# Patient Record
Sex: FEMALE | Race: OTHER | HISPANIC OR LATINO | URBAN - METROPOLITAN AREA
[De-identification: names, ages, dates, MRNs, and addresses within clinical notes are randomized per-mention and may not be internally consistent; named-entity substitution may affect disease eponyms.]

---

## 2018-01-24 ENCOUNTER — EMERGENCY (EMERGENCY)
Facility: HOSPITAL | Age: 38
LOS: 1 days | Discharge: HOME | End: 2018-01-24
Attending: EMERGENCY MEDICINE
Payer: COMMERCIAL

## 2018-01-24 VITALS
RESPIRATION RATE: 18 BRPM | DIASTOLIC BLOOD PRESSURE: 72 MMHG | SYSTOLIC BLOOD PRESSURE: 114 MMHG | HEART RATE: 84 BPM | OXYGEN SATURATION: 97 % | TEMPERATURE: 98 F

## 2018-01-24 VITALS
HEART RATE: 83 BPM | RESPIRATION RATE: 18 BRPM | DIASTOLIC BLOOD PRESSURE: 68 MMHG | SYSTOLIC BLOOD PRESSURE: 115 MMHG | OXYGEN SATURATION: 98 % | TEMPERATURE: 99 F

## 2018-01-24 LAB
ALBUMIN SERPL ELPH-MCNC: 3.7 G/DL — SIGNIFICANT CHANGE UP (ref 3.5–5)
ALP SERPL-CCNC: 51 U/L — SIGNIFICANT CHANGE UP (ref 40–120)
ALT FLD-CCNC: 19 U/L DA — SIGNIFICANT CHANGE UP (ref 10–60)
ANION GAP SERPL CALC-SCNC: 5 MMOL/L — SIGNIFICANT CHANGE UP (ref 5–17)
AST SERPL-CCNC: 19 U/L — SIGNIFICANT CHANGE UP (ref 10–40)
BASOPHILS # BLD AUTO: 0.1 K/UL — SIGNIFICANT CHANGE UP (ref 0–0.2)
BASOPHILS NFR BLD AUTO: 0.9 % — SIGNIFICANT CHANGE UP (ref 0–2)
BILIRUB SERPL-MCNC: 0.4 MG/DL — SIGNIFICANT CHANGE UP (ref 0.2–1.2)
BUN SERPL-MCNC: 10 MG/DL — SIGNIFICANT CHANGE UP (ref 7–18)
CALCIUM SERPL-MCNC: 8.6 MG/DL — SIGNIFICANT CHANGE UP (ref 8.4–10.5)
CHLORIDE SERPL-SCNC: 106 MMOL/L — SIGNIFICANT CHANGE UP (ref 96–108)
CO2 SERPL-SCNC: 29 MMOL/L — SIGNIFICANT CHANGE UP (ref 22–31)
CREAT SERPL-MCNC: 0.57 MG/DL — SIGNIFICANT CHANGE UP (ref 0.5–1.3)
EOSINOPHIL # BLD AUTO: 0.1 K/UL — SIGNIFICANT CHANGE UP (ref 0–0.5)
EOSINOPHIL NFR BLD AUTO: 0.9 % — SIGNIFICANT CHANGE UP (ref 0–6)
GLUCOSE SERPL-MCNC: 80 MG/DL — SIGNIFICANT CHANGE UP (ref 70–99)
HCG SERPL-ACNC: <1 MIU/ML — SIGNIFICANT CHANGE UP
HCG UR QL: NEGATIVE — SIGNIFICANT CHANGE UP
HCT VFR BLD CALC: 40.1 % — SIGNIFICANT CHANGE UP (ref 34.5–45)
HGB BLD-MCNC: 12.4 G/DL — SIGNIFICANT CHANGE UP (ref 11.5–15.5)
LYMPHOCYTES # BLD AUTO: 1.6 K/UL — SIGNIFICANT CHANGE UP (ref 1–3.3)
LYMPHOCYTES # BLD AUTO: 19.7 % — SIGNIFICANT CHANGE UP (ref 13–44)
MCHC RBC-ENTMCNC: 26.4 PG — LOW (ref 27–34)
MCHC RBC-ENTMCNC: 31 GM/DL — LOW (ref 32–36)
MCV RBC AUTO: 85.3 FL — SIGNIFICANT CHANGE UP (ref 80–100)
MONOCYTES # BLD AUTO: 0.6 K/UL — SIGNIFICANT CHANGE UP (ref 0–0.9)
MONOCYTES NFR BLD AUTO: 7.6 % — SIGNIFICANT CHANGE UP (ref 2–14)
NEUTROPHILS # BLD AUTO: 5.7 K/UL — SIGNIFICANT CHANGE UP (ref 1.8–7.4)
NEUTROPHILS NFR BLD AUTO: 70.9 % — SIGNIFICANT CHANGE UP (ref 43–77)
PLATELET # BLD AUTO: 293 K/UL — SIGNIFICANT CHANGE UP (ref 150–400)
POTASSIUM SERPL-MCNC: 4 MMOL/L — SIGNIFICANT CHANGE UP (ref 3.5–5.3)
POTASSIUM SERPL-SCNC: 4 MMOL/L — SIGNIFICANT CHANGE UP (ref 3.5–5.3)
PROT SERPL-MCNC: 7.5 G/DL — SIGNIFICANT CHANGE UP (ref 6–8.3)
RBC # BLD: 4.7 M/UL — SIGNIFICANT CHANGE UP (ref 3.8–5.2)
RBC # FLD: 14 % — SIGNIFICANT CHANGE UP (ref 10.3–14.5)
SODIUM SERPL-SCNC: 140 MMOL/L — SIGNIFICANT CHANGE UP (ref 135–145)
WBC # BLD: 8 K/UL — SIGNIFICANT CHANGE UP (ref 3.8–10.5)
WBC # FLD AUTO: 8 K/UL — SIGNIFICANT CHANGE UP (ref 3.8–10.5)

## 2018-01-24 PROCEDURE — 74176 CT ABD & PELVIS W/O CONTRAST: CPT | Mod: 26

## 2018-01-24 PROCEDURE — 74176 CT ABD & PELVIS W/O CONTRAST: CPT

## 2018-01-24 PROCEDURE — 96374 THER/PROPH/DIAG INJ IV PUSH: CPT

## 2018-01-24 PROCEDURE — 81025 URINE PREGNANCY TEST: CPT

## 2018-01-24 PROCEDURE — 80053 COMPREHEN METABOLIC PANEL: CPT

## 2018-01-24 PROCEDURE — 70450 CT HEAD/BRAIN W/O DYE: CPT

## 2018-01-24 PROCEDURE — 99284 EMERGENCY DEPT VISIT MOD MDM: CPT | Mod: 25

## 2018-01-24 PROCEDURE — 71250 CT THORAX DX C-: CPT | Mod: 26

## 2018-01-24 PROCEDURE — 99285 EMERGENCY DEPT VISIT HI MDM: CPT

## 2018-01-24 PROCEDURE — 72125 CT NECK SPINE W/O DYE: CPT | Mod: 26

## 2018-01-24 PROCEDURE — 71250 CT THORAX DX C-: CPT

## 2018-01-24 PROCEDURE — 85027 COMPLETE CBC AUTOMATED: CPT

## 2018-01-24 PROCEDURE — 70450 CT HEAD/BRAIN W/O DYE: CPT | Mod: 26

## 2018-01-24 PROCEDURE — 84702 CHORIONIC GONADOTROPIN TEST: CPT

## 2018-01-24 PROCEDURE — 72125 CT NECK SPINE W/O DYE: CPT

## 2018-01-24 RX ORDER — ACETAMINOPHEN 500 MG
1000 TABLET ORAL ONCE
Qty: 0 | Refills: 0 | Status: COMPLETED | OUTPATIENT
Start: 2018-01-24 | End: 2018-01-24

## 2018-01-24 RX ADMIN — Medication 400 MILLIGRAM(S): at 17:55
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
## 2018-01-24 NOTE — ED ADULT NURSE NOTE - OBJECTIVE STATEMENT
pt from home c/o of pain to occipital area with lower tailbone area pain s/p fell back and hit head yesterday pt is alert awake denies any LOC ambulatory with steady gait
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
## 2018-01-24 NOTE — ED PROVIDER NOTE - ENMT, MLM
Airway patent, Nasal mucosa clear. Mouth with normal mucosa. Throat has no vesicles, no oropharyngeal exudates and uvula is midline. Tenderness to posterior scalp.

## 2018-11-15 ENCOUNTER — APPOINTMENT (OUTPATIENT)
Dept: PLASTIC SURGERY | Facility: CLINIC | Age: 38
End: 2018-11-15

## 2018-11-15 PROBLEM — Z00.00 ENCOUNTER FOR PREVENTIVE HEALTH EXAMINATION: Status: ACTIVE | Noted: 2018-11-15

## 2020-07-13 ENCOUNTER — EMERGENCY (EMERGENCY)
Facility: HOSPITAL | Age: 40
LOS: 1 days | Discharge: HOME | End: 2020-07-13
Attending: EMERGENCY MEDICINE
Payer: COMMERCIAL

## 2020-07-13 VITALS
HEIGHT: 64.57 IN | TEMPERATURE: 98 F | HEART RATE: 67 BPM | RESPIRATION RATE: 16 BRPM | DIASTOLIC BLOOD PRESSURE: 77 MMHG | OXYGEN SATURATION: 100 % | WEIGHT: 126.77 LBS | SYSTOLIC BLOOD PRESSURE: 118 MMHG

## 2020-07-13 LAB — SARS-COV-2 RNA SPEC QL NAA+PROBE: SIGNIFICANT CHANGE UP

## 2020-07-13 PROCEDURE — 99283 EMERGENCY DEPT VISIT LOW MDM: CPT | Mod: 25

## 2020-07-13 PROCEDURE — 94640 AIRWAY INHALATION TREATMENT: CPT

## 2020-07-13 PROCEDURE — U0003: CPT

## 2020-07-13 PROCEDURE — 99283 EMERGENCY DEPT VISIT LOW MDM: CPT

## 2020-07-13 RX ORDER — ALBUTEROL 90 UG/1
2 AEROSOL, METERED ORAL ONCE
Refills: 0 | Status: COMPLETED | OUTPATIENT
Start: 2020-07-13 | End: 2020-07-13

## 2020-07-13 RX ADMIN — ALBUTEROL 2 PUFF(S): 90 AEROSOL, METERED ORAL at 08:29

## 2020-07-13 NOTE — ED ADULT NURSE NOTE - NSIMPLEMENTINTERV_GEN_ALL_ED
Implemented All Universal Safety Interventions:  [place] to call system. Call bell, personal items and telephone within reach. Instruct patient to call for assistance. Room bathroom lighting operational. Non-slip footwear when patient is off stretcher. Physically safe environment: no spills, clutter or unnecessary equipment. Stretcher in lowest position, wheels locked, appropriate side rails in place.

## 2020-07-13 NOTE — ED PROVIDER NOTE - NSFOLLOWUPINSTRUCTIONS_ED_ALL_ED_FT
You were tested today for coronavirus. We have attached a packet of information with your discharge paperwork which you should read. Current guidelines are that you and your household should self- quarantine (do not go out). Everyone in the house should stay in for a minimum of 7 days but you must also remain quarantined for 72 hours beyond your last measured fever.     The most serious complication of coronavirus is low oxygen. Your oxygen was ok today but your symptoms and condition may change or deteriorate. It is important that you watch your breathing. If you have any shortness of breath, you must return to the Emergency Department right away to be evaluated again. Delay in return can be very dangerous and you must be vigilant.    Testing results for Covid-19 / novel coronavirus should be called to you within 5 days. There is a phone number in the attached packet with phone number of our hotline which can provide you with results and more information. Call if you do not hear back or have questions about your illness.     Use albuterol 1-2 puff every 4 hrs as needed for wheeze.

## 2020-07-13 NOTE — ED PROVIDER NOTE - OBJECTIVE STATEMENT
40 female asthma, on seroquel, notes mild ha, sore throat, nasal congestion for a day or two, requesting covid-19 testing. no known direct exposure. works w elderly. no sob. no pleuritic. no cough. no cp. no fever. no trouble swallowing.

## 2020-07-13 NOTE — ED ADULT NURSE NOTE - OBJECTIVE STATEMENT
C/o sore throat with easy respirations and no sob.  Occupation is with senior adults.  Counseled on importance of wearing mask, social distancing and when to come to the ED.  Patient made aware she should quarantine until result received and further instruction will be given after results.

## 2020-07-13 NOTE — ED PROVIDER NOTE - CPE EDP MUSC NORM
9/25/2020              Ban Sorensen        [address] [address]         To Whom It May Concern,      Ban Sorensen is pregnant and currently under my medical care.  Provided there are no complications she may continue to normal...

## 2020-07-13 NOTE — ED PROVIDER NOTE - PATIENT PORTAL LINK FT
You can access the FollowMyHealth Patient Portal offered by [facility] by registering at the following website: [email or web address]. By joining [employer]’s FollowMyHealth portal, you will also be able to view your health information using other applications (apps) compatible with our system.

## 2020-12-21 ENCOUNTER — EMERGENCY (EMERGENCY)
Facility: HOSPITAL | Age: 40
LOS: 1 days | Discharge: HOME | End: 2020-12-21
Attending: EMERGENCY MEDICINE
Payer: COMMERCIAL

## 2020-12-21 VITALS
DIASTOLIC BLOOD PRESSURE: 78 MMHG | TEMPERATURE: 99 F | SYSTOLIC BLOOD PRESSURE: 110 MMHG | OXYGEN SATURATION: 98 % | RESPIRATION RATE: 18 BRPM | WEIGHT: 130.07 LBS | HEIGHT: 64.57 IN | HEART RATE: 82 BPM

## 2020-12-21 PROCEDURE — 99283 EMERGENCY DEPT VISIT LOW MDM: CPT

## 2020-12-21 PROCEDURE — 90471 IMMUNIZATION ADMIN: CPT

## 2020-12-21 PROCEDURE — 12001 RPR S/N/AX/GEN/TRNK 2.5CM/<: CPT

## 2020-12-21 PROCEDURE — 99283 EMERGENCY DEPT VISIT LOW MDM: CPT | Mod: 25

## 2020-12-21 PROCEDURE — 90715 TDAP VACCINE 7 YRS/> IM: CPT

## 2020-12-21 RX ORDER — TETANUS TOXOID, REDUCED DIPHTHERIA TOXOID AND ACELLULAR PERTUSSIS VACCINE, ADSORBED 5; 2.5; 8; 8; 2.5 [IU]/.5ML; [IU]/.5ML; UG/.5ML; UG/.5ML; UG/.5ML
0.5 SUSPENSION INTRAMUSCULAR ONCE
Refills: 0 | Status: COMPLETED | OUTPATIENT
Start: 2020-12-21 | End: 2020-12-21

## 2020-12-21 RX ADMIN — TETANUS TOXOID, REDUCED DIPHTHERIA TOXOID AND ACELLULAR PERTUSSIS VACCINE, ADSORBED 0.5 MILLILITER(S): 5; 2.5; 8; 8; 2.5 SUSPENSION INTRAMUSCULAR at 19:24

## 2020-12-21 NOTE — ED PROVIDER NOTE - PATIENT PORTAL LINK FT
You can access the FollowMyHealth Patient Portal offered by [facility] by registering at the following website: [email or web address]. By joining [employer]’s FollowMyHealth portal, you will also be able to view your health information using other applications (apps) compatible with our system.

## 2020-12-21 NOTE — ED PROVIDER NOTE - NSFOLLOWUPINSTRUCTIONS_ED_ALL_ED_FT
Keep the finger dry for 24 hours. Then you can wash with soap and water.  Keep uncovered.  Avoid soaking in water.   Follow up with the primary care doctor as needed in 1 week.  If you experience any new or worsening symptoms or if you are concerned you can always come back to the emergency for a re-evaluation.  For pain you can take over the counter Ibuprofen 600 mg orally every 6 hours as needed for pain. Take medication with food.

## 2020-12-21 NOTE — ED PROVIDER NOTE - OBJECTIVE STATEMENT
40 year-old female, no signficant PMHx, presents with R middle finger laceration on sharp lid today. Now c/o throbbing pain to the area of laceration. No other symptoms. Last tetanus immunization unknown.

## 2020-12-21 NOTE — ED PROVIDER NOTE - PROGRESS NOTE DETAILS
Lac reapired. Dc home. Pt is well appearing walking with steady gait, stable for discharge and follow up without fail with medical doctor. I had a detailed discussion with the patient and/or guardian regarding the historical points, exam findings, and any diagnostic results supporting the discharge diagnosis. Pt educated on care and need for follow up. Strict return instructions and red flag signs and symptoms discussed with patient. Questions answered. Pt shows understanding of discharge information and agrees to follow.

## 2021-03-22 ENCOUNTER — EMERGENCY (EMERGENCY)
Facility: HOSPITAL | Age: 41
LOS: 1 days | Discharge: HOME | End: 2021-03-22
Attending: STUDENT IN AN ORGANIZED HEALTH CARE EDUCATION/TRAINING PROGRAM
Payer: MEDICAID

## 2021-03-22 VITALS
WEIGHT: 160.94 LBS | OXYGEN SATURATION: 97 % | HEART RATE: 69 BPM | SYSTOLIC BLOOD PRESSURE: 110 MMHG | HEIGHT: 64.57 IN | RESPIRATION RATE: 18 BRPM | DIASTOLIC BLOOD PRESSURE: 69 MMHG | TEMPERATURE: 99 F

## 2021-03-22 LAB — SARS-COV-2 RNA SPEC QL NAA+PROBE: SIGNIFICANT CHANGE UP

## 2021-03-22 PROCEDURE — 87635 SARS-COV-2 COVID-19 AMP PRB: CPT

## 2021-03-22 PROCEDURE — 99283 EMERGENCY DEPT VISIT LOW MDM: CPT

## 2021-03-22 PROCEDURE — 99282 EMERGENCY DEPT VISIT SF MDM: CPT

## 2021-03-22 NOTE — ED PROVIDER NOTE - PATIENT PORTAL LINK FT
You can access the FollowMyHealth Patient Portal offered by [facility] by registering at the following website: [email or web address]. By joining [employer]’s FollowMyHealth portal, you will also be able to view your health information using other applications (apps) compatible with our system.

## 2021-03-22 NOTE — ED PROVIDER NOTE - OBJECTIVE STATEMENT
39yo F presenting for covid test. patient is hha and her patient was just diagnosed with covid therefore patient concerned given exposure. patient denies f/c, cp, sob, cough, abd pain or other complaints.

## 2021-03-22 NOTE — ED ADULT TRIAGE NOTE - CHIEF COMPLAINT QUOTE
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

## 2021-03-31 ENCOUNTER — EMERGENCY (EMERGENCY)
Facility: HOSPITAL | Age: 41
LOS: 1 days | Discharge: HOME | End: 2021-03-31
Attending: STUDENT IN AN ORGANIZED HEALTH CARE EDUCATION/TRAINING PROGRAM
Payer: MEDICAID

## 2021-03-31 VITALS
DIASTOLIC BLOOD PRESSURE: 71 MMHG | SYSTOLIC BLOOD PRESSURE: 106 MMHG | RESPIRATION RATE: 16 BRPM | HEART RATE: 84 BPM | HEIGHT: 64.57 IN | OXYGEN SATURATION: 98 % | TEMPERATURE: 99 F | WEIGHT: 130.07 LBS

## 2021-03-31 LAB — SARS-COV-2 RNA SPEC QL NAA+PROBE: DETECTED

## 2021-03-31 PROCEDURE — 99283 EMERGENCY DEPT VISIT LOW MDM: CPT

## 2021-03-31 PROCEDURE — 99284 EMERGENCY DEPT VISIT MOD MDM: CPT

## 2021-03-31 PROCEDURE — 87635 SARS-COV-2 COVID-19 AMP PRB: CPT

## 2021-03-31 NOTE — ED PROVIDER NOTE - CLINICAL SUMMARY MEDICAL DECISION MAKING FREE TEXT BOX
40F presenting with three days of fever, cough and sore throat. had known covid exposure and received vaccine. possible vaccine reaction vs covid. will test.

## 2021-03-31 NOTE — ED PROVIDER NOTE - PATIENT PORTAL LINK FT
You can access the FollowMyHealth Patient Portal offered by [facility] by registering at the following website: [email or web address]. By joining [employer]’s FollowMyHealth portal, you will also be able to view your health information using other applications (apps) compatible with our system.

## 2021-03-31 NOTE — ED PROVIDER NOTE - OBJECTIVE STATEMENT
40F presenting with three days of fever, sore throat and body aches. patient had known expsore to covid-19 on 3/22 but tested negative. received landy and landy vaccine on 3/27. the day after she had are soreness and mild body aches but the next day developed fever, sore throat and body aches.

## 2021-03-31 NOTE — ED PROVIDER NOTE - NSFOLLOWUPINSTRUCTIONS_ED_ALL_ED_FT
You were seen and evaluated for infection which may be COVID 19. Testing was done. We think you are safe for discharge and to follow up in a few days with your doctor by phone or in person.   You should treat fevers by taking advil or tylenol as needed.    You should stay hydrated and increase the amount of fluid you drink.  Return to the Emergency Department if your shortness of breath becomes worse, you become weak, or new symptoms develop.    You should monitor your temperature and quarantine yourself away from others - particularly the elderly, ill, or immunosuppressed - for the next 14 days, or until you find out that you do not have COVID19.    Should your COVID19 viral swab that was performed today result POSITIVE you will be contacted by phone at the number you provided when registered for this visit.     DO NOT CALL THE EMERGENCY DEPARTMENT FOR YOUR TEST RESULTS, you may [phone].

## 2021-04-01 ENCOUNTER — TRANSCRIPTION ENCOUNTER (OUTPATIENT)
Age: 41
End: 2021-04-01

## 2021-04-01 ENCOUNTER — EMERGENCY (EMERGENCY)
Facility: HOSPITAL | Age: 41
LOS: 1 days | Discharge: HOME | End: 2021-04-01
Payer: MEDICAID

## 2021-04-01 VITALS
HEIGHT: 64.57 IN | OXYGEN SATURATION: 98 % | DIASTOLIC BLOOD PRESSURE: 63 MMHG | SYSTOLIC BLOOD PRESSURE: 104 MMHG | RESPIRATION RATE: 18 BRPM | TEMPERATURE: 98 F | WEIGHT: 130.07 LBS | HEART RATE: 80 BPM

## 2021-04-01 PROCEDURE — 99282 EMERGENCY DEPT VISIT SF MDM: CPT

## 2021-04-01 PROCEDURE — 99284 EMERGENCY DEPT VISIT MOD MDM: CPT

## 2021-04-01 RX ORDER — KETOROLAC TROMETHAMINE 30 MG/ML
15 SYRINGE (ML) INJECTION ONCE
Refills: 0 | Status: COMPLETED | OUTPATIENT
Start: 2021-04-01 | End: 2021-04-01

## 2021-04-01 RX ORDER — SODIUM CHLORIDE 9 MG/ML
1000 INJECTION INTRAMUSCULAR; INTRAVENOUS; SUBCUTANEOUS ONCE
Refills: 0 | Status: DISCONTINUED | OUTPATIENT
Start: 2021-04-01 | End: 2021-04-04

## 2021-04-01 NOTE — ED PROVIDER NOTE - PATIENT PORTAL LINK FT
You can access the FollowMyHealth Patient Portal offered by [facility] by registering at the following website: [email or web address]. By joining [employer]’s FollowMyHealth portal, you will also be able to view your health information using other applications (apps) compatible with our system.

## 2021-04-01 NOTE — ED PROVIDER NOTE - OBJECTIVE STATEMENT
41 y/o F pt diagnosed with COVID-19 yesterday, presents to the ED with complaints of difficulty breathing waxing and waning, fevers and generalized malaise. Patient reports her last Tylenol intake was 3am and was told by the department of health to come to the ED.

## 2021-04-01 NOTE — ED ADULT TRIAGE NOTE - CHIEF COMPLAINT QUOTE
c/o fever , difficulty  breathing last night . reports took [employer]el [employer] Jamel vaccine  3/27 . tested  + covid 3/31

## 2021-04-01 NOTE — ED PROVIDER NOTE - PROGRESS NOTE DETAILS
Expressing relief, will rest at home. Pt is well appearing walking with steady gait, stable for discharge and follow up without fail with medical doctor. I had a detailed discussion with the patient and/or guardian regarding the historical points, exam findings, and any diagnostic results supporting the discharge diagnosis. Pt educated on care and need for follow up. Strict return instructions and red flag signs and symptoms discussed with patient. Questions answered. Pt shows understanding of discharge information and agrees to follow.

## 2021-04-01 NOTE — ED PROVIDER NOTE - NSFOLLOWUPINSTRUCTIONS_ED_ALL_ED_FT
You likely have Coronavirus.    COVID-19 testing are currently being prioritized at [facility] for admitted patients.     All patients that are stable are being discharged from the ED, even if there is a concern for coronavirus. Since you are stable, you are being discharged. Your test results may take 5-7 days. You will get a text message or email with results. Please check the patient online portal for results. Please follow the instructions on provided coronavirus discharge educational forms and self quarantine for 14 days.     In addition, you have been placed on our surveillance tracker. Return to the ED immediately if you have shortness of breath, fever, pain, weakness, vomiting any concerns.    1. STAY HOME for 14 DAYS  2. Minimize Human contact to ONLY ESSENTIAL  3. Every time you wash your hands, sing the HAPPY BIRTHDAY Song so you know you're washing long enough.  Make sure to scrub the webspace between your fingers.  4. DRINK 1-3 Liters of fluids day x at least 5 days.  To remain hydrated. Your fatigue, lightheadedness, and body aches will decrease and your fever has a better chance of breaking if you are well hydrated.    5. For your Fever and Body aches takes Tylenol 650-100mg every 4-6h (max 4000mg/day). Try not to use ibuprofen, aspirin or naproxen (Advil, Motrin or Aleve) as these may worsen Coronavirus infection.  6. Use an inhaler for mild shortness of breath and cough  7. Take a double or triple dose of Vitamin C (7452-4474 mg) per day spread out over the day .  8. RETURN TO THE ER IMMEDIATELY IF YOU HAVE WORSENING SHORTNESS OF BREATH. SYMPTOMS USUALLY PEAK BETWEEN DAY 7-10.

## 2021-04-04 ENCOUNTER — EMERGENCY (EMERGENCY)
Facility: HOSPITAL | Age: 41
LOS: 1 days | Discharge: HOME | End: 2021-04-04
Attending: STUDENT IN AN ORGANIZED HEALTH CARE EDUCATION/TRAINING PROGRAM
Payer: MEDICAID

## 2021-04-04 VITALS
TEMPERATURE: 98 F | HEART RATE: 79 BPM | DIASTOLIC BLOOD PRESSURE: 68 MMHG | OXYGEN SATURATION: 99 % | SYSTOLIC BLOOD PRESSURE: 105 MMHG | RESPIRATION RATE: 20 BRPM | WEIGHT: 130.51 LBS | HEIGHT: 64.57 IN

## 2021-04-04 VITALS
TEMPERATURE: 98 F | HEART RATE: 78 BPM | SYSTOLIC BLOOD PRESSURE: 101 MMHG | RESPIRATION RATE: 17 BRPM | DIASTOLIC BLOOD PRESSURE: 66 MMHG | OXYGEN SATURATION: 95 %

## 2021-04-04 LAB
ALBUMIN SERPL ELPH-MCNC: 3.7 G/DL — SIGNIFICANT CHANGE UP (ref 3.5–5)
ALP SERPL-CCNC: 59 U/L — SIGNIFICANT CHANGE UP (ref 40–120)
ALT FLD-CCNC: 33 U/L DA — SIGNIFICANT CHANGE UP (ref 10–60)
ANION GAP SERPL CALC-SCNC: 7 MMOL/L — SIGNIFICANT CHANGE UP (ref 5–17)
APTT BLD: 28.8 SEC — SIGNIFICANT CHANGE UP (ref 27.5–35.5)
AST SERPL-CCNC: 19 U/L — SIGNIFICANT CHANGE UP (ref 10–40)
BASOPHILS # BLD AUTO: 0.01 K/UL — SIGNIFICANT CHANGE UP (ref 0–0.2)
BASOPHILS NFR BLD AUTO: 0.2 % — SIGNIFICANT CHANGE UP (ref 0–2)
BILIRUB SERPL-MCNC: 0.2 MG/DL — SIGNIFICANT CHANGE UP (ref 0.2–1.2)
BUN SERPL-MCNC: 17 MG/DL — SIGNIFICANT CHANGE UP (ref 7–18)
CALCIUM SERPL-MCNC: 8.7 MG/DL — SIGNIFICANT CHANGE UP (ref 8.4–10.5)
CHLORIDE SERPL-SCNC: 110 MMOL/L — HIGH (ref 96–108)
CO2 SERPL-SCNC: 23 MMOL/L — SIGNIFICANT CHANGE UP (ref 22–31)
CREAT SERPL-MCNC: 0.8 MG/DL — SIGNIFICANT CHANGE UP (ref 0.5–1.3)
D DIMER BLD IA.RAPID-MCNC: <150 NG/ML DDU — SIGNIFICANT CHANGE UP
EOSINOPHIL # BLD AUTO: 0.02 K/UL — SIGNIFICANT CHANGE UP (ref 0–0.5)
EOSINOPHIL NFR BLD AUTO: 0.5 % — SIGNIFICANT CHANGE UP (ref 0–6)
GLUCOSE SERPL-MCNC: 88 MG/DL — SIGNIFICANT CHANGE UP (ref 70–99)
HCG SERPL-ACNC: <1 MIU/ML — SIGNIFICANT CHANGE UP
HCT VFR BLD CALC: 39.5 % — SIGNIFICANT CHANGE UP (ref 34.5–45)
HGB BLD-MCNC: 13.1 G/DL — SIGNIFICANT CHANGE UP (ref 11.5–15.5)
IMM GRANULOCYTES NFR BLD AUTO: 0.2 % — SIGNIFICANT CHANGE UP (ref 0–1.5)
INR BLD: 1.12 RATIO — SIGNIFICANT CHANGE UP (ref 0.88–1.16)
LYMPHOCYTES # BLD AUTO: 1.43 K/UL — SIGNIFICANT CHANGE UP (ref 1–3.3)
LYMPHOCYTES # BLD AUTO: 33.3 % — SIGNIFICANT CHANGE UP (ref 13–44)
MAGNESIUM SERPL-MCNC: 1.9 MG/DL — SIGNIFICANT CHANGE UP (ref 1.6–2.6)
MCHC RBC-ENTMCNC: 27.5 PG — SIGNIFICANT CHANGE UP (ref 27–34)
MCHC RBC-ENTMCNC: 33.2 GM/DL — SIGNIFICANT CHANGE UP (ref 32–36)
MCV RBC AUTO: 82.8 FL — SIGNIFICANT CHANGE UP (ref 80–100)
MONOCYTES # BLD AUTO: 0.48 K/UL — SIGNIFICANT CHANGE UP (ref 0–0.9)
MONOCYTES NFR BLD AUTO: 11.2 % — SIGNIFICANT CHANGE UP (ref 2–14)
NEUTROPHILS # BLD AUTO: 2.35 K/UL — SIGNIFICANT CHANGE UP (ref 1.8–7.4)
NEUTROPHILS NFR BLD AUTO: 54.6 % — SIGNIFICANT CHANGE UP (ref 43–77)
NRBC # BLD: 0 /100 WBCS — SIGNIFICANT CHANGE UP (ref 0–0)
PLATELET # BLD AUTO: 162 K/UL — SIGNIFICANT CHANGE UP (ref 150–400)
POTASSIUM SERPL-MCNC: 4.3 MMOL/L — SIGNIFICANT CHANGE UP (ref 3.5–5.3)
POTASSIUM SERPL-SCNC: 4.3 MMOL/L — SIGNIFICANT CHANGE UP (ref 3.5–5.3)
PROT SERPL-MCNC: 7 G/DL — SIGNIFICANT CHANGE UP (ref 6–8.3)
PROTHROM AB SERPL-ACNC: 13.2 SEC — SIGNIFICANT CHANGE UP (ref 10.6–13.6)
RBC # BLD: 4.77 M/UL — SIGNIFICANT CHANGE UP (ref 3.8–5.2)
RBC # FLD: 13.3 % — SIGNIFICANT CHANGE UP (ref 10.3–14.5)
SODIUM SERPL-SCNC: 140 MMOL/L — SIGNIFICANT CHANGE UP (ref 135–145)
TROPONIN I SERPL-MCNC: <0.015 NG/ML — SIGNIFICANT CHANGE UP (ref 0–0.04)
WBC # BLD: 4.3 K/UL — SIGNIFICANT CHANGE UP (ref 3.8–10.5)
WBC # FLD AUTO: 4.3 K/UL — SIGNIFICANT CHANGE UP (ref 3.8–10.5)

## 2021-04-04 PROCEDURE — 85025 COMPLETE CBC W/AUTO DIFF WBC: CPT

## 2021-04-04 PROCEDURE — 93005 ELECTROCARDIOGRAM TRACING: CPT

## 2021-04-04 PROCEDURE — 85379 FIBRIN DEGRADATION QUANT: CPT

## 2021-04-04 PROCEDURE — 85610 PROTHROMBIN TIME: CPT

## 2021-04-04 PROCEDURE — 71045 X-RAY EXAM CHEST 1 VIEW: CPT | Mod: 26

## 2021-04-04 PROCEDURE — 83735 ASSAY OF MAGNESIUM: CPT

## 2021-04-04 PROCEDURE — 71045 X-RAY EXAM CHEST 1 VIEW: CPT

## 2021-04-04 PROCEDURE — 96375 TX/PRO/DX INJ NEW DRUG ADDON: CPT

## 2021-04-04 PROCEDURE — 80053 COMPREHEN METABOLIC PANEL: CPT

## 2021-04-04 PROCEDURE — 84484 ASSAY OF TROPONIN QUANT: CPT

## 2021-04-04 PROCEDURE — 93010 ELECTROCARDIOGRAM REPORT: CPT

## 2021-04-04 PROCEDURE — 99285 EMERGENCY DEPT VISIT HI MDM: CPT

## 2021-04-04 PROCEDURE — 36415 COLL VENOUS BLD VENIPUNCTURE: CPT

## 2021-04-04 PROCEDURE — 84702 CHORIONIC GONADOTROPIN TEST: CPT

## 2021-04-04 PROCEDURE — 96374 THER/PROPH/DIAG INJ IV PUSH: CPT

## 2021-04-04 PROCEDURE — 99284 EMERGENCY DEPT VISIT MOD MDM: CPT | Mod: 25

## 2021-04-04 PROCEDURE — 85730 THROMBOPLASTIN TIME PARTIAL: CPT

## 2021-04-04 RX ORDER — KETOROLAC TROMETHAMINE 30 MG/ML
30 SYRINGE (ML) INJECTION ONCE
Refills: 0 | Status: DISCONTINUED | OUTPATIENT
Start: 2021-04-04 | End: 2021-04-04

## 2021-04-04 RX ORDER — SODIUM CHLORIDE 9 MG/ML
2000 INJECTION INTRAMUSCULAR; INTRAVENOUS; SUBCUTANEOUS ONCE
Refills: 0 | Status: COMPLETED | OUTPATIENT
Start: 2021-04-04 | End: 2021-04-04

## 2021-04-04 RX ORDER — ONDANSETRON 8 MG/1
4 TABLET, FILM COATED ORAL ONCE
Refills: 0 | Status: COMPLETED | OUTPATIENT
Start: 2021-04-04 | End: 2021-04-04

## 2021-04-04 RX ADMIN — SODIUM CHLORIDE 1000 MILLILITER(S): 9 INJECTION INTRAMUSCULAR; INTRAVENOUS; SUBCUTANEOUS at 14:39

## 2021-04-04 RX ADMIN — Medication 30 MILLIGRAM(S): at 14:39

## 2021-04-04 RX ADMIN — ONDANSETRON 4 MILLIGRAM(S): 8 TABLET, FILM COATED ORAL at 16:01

## 2021-04-04 NOTE — ED PROVIDER NOTE - PROGRESS NOTE DETAILS
sohrawardy: 39yo F recent diagnosis w covid p/w cp, fatigue, fever.   imaging and labs reviewed. imaging showing no significant acute pathology. patient satting well RA.   stable for dc w return precautions, covid education and quarantine instructions.

## 2021-04-04 NOTE — ED PROVIDER NOTE - OBJECTIVE STATEMENT
40F, pmh of asthma, presenting with fever, chest pain and shortness of breath. has nausea and some vomiting. patient recently diagnosed with covid. has been taking tylenol at home with minimal relief.

## 2021-04-04 NOTE — ED PROVIDER NOTE - PATIENT PORTAL LINK FT
You can access the FollowMyHealth Patient Portal offered by [facility] by registering at the following website: [email or web address]. By joining [employer]’s FollowMyHealth portal, you will also be able to view your health information using other applications (apps) compatible with our system.

## 2021-04-04 NOTE — ED PROVIDER NOTE - CLINICAL SUMMARY MEDICAL DECISION MAKING FREE TEXT BOX
40F presenting with headache, fever and body aches. symptoms likely 2/2 covid. will get labs, ekg, cxr, symptom control. will reassess.

## 2021-04-15 ENCOUNTER — EMERGENCY (EMERGENCY)
Facility: HOSPITAL | Age: 41
LOS: 1 days | End: 2021-04-15
Admitting: EMERGENCY MEDICINE
Payer: MEDICAID

## 2021-04-15 VITALS — HEIGHT: 64.57 IN

## 2021-04-15 PROCEDURE — L9991: CPT

## 2021-04-15 NOTE — ED ADULT TRIAGE NOTE - OTHER COMPLAINTS
pt was Asymptomatic and wants to repeat the Covid test . pt was offered the information on the different site which offer the Covid test , pt left before seeing the physician and vitals sign

## 2021-05-11 ENCOUNTER — EMERGENCY (EMERGENCY)
Facility: HOSPITAL | Age: 41
LOS: 1 days | Discharge: HOME | End: 2021-05-11
Attending: STUDENT IN AN ORGANIZED HEALTH CARE EDUCATION/TRAINING PROGRAM
Payer: MEDICAID

## 2021-05-11 VITALS
TEMPERATURE: 98 F | DIASTOLIC BLOOD PRESSURE: 70 MMHG | WEIGHT: 130.07 LBS | OXYGEN SATURATION: 100 % | HEIGHT: 64.57 IN | HEART RATE: 71 BPM | RESPIRATION RATE: 16 BRPM | SYSTOLIC BLOOD PRESSURE: 103 MMHG

## 2021-05-11 VITALS
TEMPERATURE: 98 F | RESPIRATION RATE: 15 BRPM | DIASTOLIC BLOOD PRESSURE: 67 MMHG | OXYGEN SATURATION: 99 % | HEART RATE: 66 BPM | SYSTOLIC BLOOD PRESSURE: 106 MMHG

## 2021-05-11 LAB — HCG UR QL: NEGATIVE — SIGNIFICANT CHANGE UP

## 2021-05-11 PROCEDURE — 73610 X-RAY EXAM OF ANKLE: CPT

## 2021-05-11 PROCEDURE — 73620 X-RAY EXAM OF FOOT: CPT

## 2021-05-11 PROCEDURE — 81025 URINE PREGNANCY TEST: CPT

## 2021-05-11 PROCEDURE — 99284 EMERGENCY DEPT VISIT MOD MDM: CPT

## 2021-05-11 PROCEDURE — 73610 X-RAY EXAM OF ANKLE: CPT | Mod: 26,LT

## 2021-05-11 PROCEDURE — 73620 X-RAY EXAM OF FOOT: CPT | Mod: 26,LT

## 2021-05-11 PROCEDURE — 99284 EMERGENCY DEPT VISIT MOD MDM: CPT | Mod: 25

## 2021-05-11 RX ORDER — BNT162B2 0.23 MG/2.25ML
0.3 INJECTION, SUSPENSION INTRAMUSCULAR ONCE
Refills: 0 | Status: COMPLETED | OUTPATIENT
Start: 2021-05-11 | End: 2021-05-11

## 2021-05-11 RX ORDER — OXYCODONE AND ACETAMINOPHEN 5; 325 MG/1; MG/1
1 TABLET ORAL ONCE
Refills: 0 | Status: DISCONTINUED | OUTPATIENT
Start: 2021-05-11 | End: 2021-05-11

## 2021-05-11 RX ORDER — ACETAMINOPHEN 500 MG
650 TABLET ORAL ONCE
Refills: 0 | Status: COMPLETED | OUTPATIENT
Start: 2021-05-11 | End: 2021-05-11

## 2021-05-11 RX ADMIN — OXYCODONE AND ACETAMINOPHEN 1 TABLET(S): 5; 325 TABLET ORAL at 13:28

## 2021-05-11 RX ADMIN — BNT162B2 0.3 MILLILITER(S): 0.23 INJECTION, SUSPENSION INTRAMUSCULAR at 13:07

## 2021-05-11 RX ADMIN — Medication 650 MILLIGRAM(S): at 08:53

## 2021-05-11 RX ADMIN — Medication 650 MILLIGRAM(S): at 10:05

## 2021-05-11 RX ADMIN — OXYCODONE AND ACETAMINOPHEN 1 TABLET(S): 5; 325 TABLET ORAL at 13:42

## 2021-05-11 NOTE — ED PROVIDER NOTE - PHYSICAL EXAMINATION
MSK: circumferential swelling around L distal ankle to dorsal aspect of foot, ecchymosis to lateral aspect of foot with positive TTP in all areas of dorsal foot

## 2021-05-11 NOTE — ED ADULT NURSE NOTE - OBJECTIVE STATEMENT
presents with c/o Lt foot pain, swelling, difficulty to bearing weight,  S/P mech fall yesterday, no deformity, numbness or tingling noted.

## 2021-05-11 NOTE — ED PROVIDER NOTE - PROGRESS NOTE DETAILS
pt w isolated navicular fracture  spoke w podiatry who states that pt can be placed in boot, WBAT and f/up in podiatry clinic for further eval and management. pt well cedrick bruno, wants to go home. Of note pt asked if she wanted to receive COVID vaccine given current program at hospital, pt agreed.  Give COVID vaccine d/c with podiatry f/u pt w isolated navicular fracture  spoke w podiatry who states that pt can be placed in boot, WBAT and f/up in podiatry clinic for further eval and management. pt well appearing otherwise, wants to go home. Of note pt asked if she wanted to receive COVID vaccine given current program at hospital, pt agreed.  Given COVID vaccine d/c with podiatry f/u

## 2021-05-11 NOTE — ED ADULT NURSE NOTE - ED STAT RN HANDOFF DETAILS
Patient alert and verbally responsive, Covid vaccine offered Patient accepted. Lt Deltoid 0.3 ml IM vaccine administered as per order, no A/R noted  instruction provided.

## 2021-05-11 NOTE — ED PROVIDER NOTE - PATIENT PORTAL LINK FT
You can access the FollowMyHealth Patient Portal offered by [facility] by registering at the following website: [email or web address]. By joining [employer]’s FollowMyHealth portal, you will also be able to view your health information using other applications (apps) compatible with our system.

## 2021-05-11 NOTE — ED PROVIDER NOTE - CARE PLAN
Principal Discharge DX:	Closed nondisplaced fracture of navicular bone of left foot, initial encounter

## 2021-05-11 NOTE — ED PROVIDER NOTE - OBJECTIVE STATEMENT
39 y/o female with no significant PMHx presents to the ED c/o L foot and ankle pain/swelling x today s/p mechanical trip and fall. Pt notes she was walking down stairs when she missed a step, causing her to fall and twist her L ankle, landing on her L foot. Pt notes increased pain with ambulation. Pt denies paresthesia, numbness, head strike, syncope, or any other complaints. NKDA.

## 2021-05-12 ENCOUNTER — APPOINTMENT (OUTPATIENT)
Dept: PODIATRY | Facility: CLINIC | Age: 41
End: 2021-05-12

## 2021-05-12 ENCOUNTER — OUTPATIENT (OUTPATIENT)
Dept: OUTPATIENT SERVICES | Facility: HOSPITAL | Age: 41
LOS: 1 days | End: 2021-05-12
Payer: MEDICAID

## 2021-05-12 VITALS
TEMPERATURE: 97.5 F | OXYGEN SATURATION: 99 % | HEIGHT: 65 IN | HEART RATE: 81 BPM | BODY MASS INDEX: 24.66 KG/M2 | DIASTOLIC BLOOD PRESSURE: 72 MMHG | RESPIRATION RATE: 18 BRPM | WEIGHT: 148 LBS | SYSTOLIC BLOOD PRESSURE: 118 MMHG

## 2021-05-12 DIAGNOSIS — M79.672 PAIN IN LEFT FOOT: ICD-10-CM

## 2021-05-12 DIAGNOSIS — M79.673 PAIN IN UNSPECIFIED FOOT: ICD-10-CM

## 2021-05-12 DIAGNOSIS — Z00.00 ENCOUNTER FOR GENERAL ADULT MEDICAL EXAMINATION WITHOUT ABNORMAL FINDINGS: ICD-10-CM

## 2021-05-12 PROCEDURE — G0463: CPT

## 2021-05-12 NOTE — HISTORY OF PRESENT ILLNESS
[FreeTextEntry1] : 39 y/o female referred to clinic with c/o Left foot pain . Patient fell down from stairs on Sunday. patient describes her pain as sharp and rates 8/10 on VAS.\par Patient states that she had history of fall before and she tried ice compress.Patient has taken Tylenol for pasin relief but she did not have and improvement.Patient states that she has pain along base of left 5th metatarsal and dorsal aspect of her left foot.Patient is trying to go for body building competition in june. she is not able to walk due to her pain.Patient denies fever,chills,nausea,vomiting,sob\par \par PMH:Non\par PSH:cosmetic breast augmentation\par FH: smokes 3 cigarettes per day. drinks alcohol occasionally\par Allergy:NKDA \par

## 2021-05-12 NOTE — ASSESSMENT
[FreeTextEntry1] : Physical exam:\par Vasc: DP/PT 2/4 b/l, TG warm to cool ,mild edema on dorsal aspect of left foot\par Derm: No open lesion,  No clinical sign of infection, echymosis on medial aspect of left foot\par Msk: pain on palpation along  styloid process of left 5th metatarsal , pain along dorsal aspect of left foot at 2nd and 3rd interspace,Passive ankle ROM painful in dorsiflexion,abduction,adduction and inversion, first ray ROM WNL,Ankle ROM 90 degree in Knee extended and +2 in knee flexed,Arch height  normal, First MPJ ROM WNL\par Pain with inversion and eversion at midtarsal and subtalar joints. point tender on sinus tarsu. \par \par Assessment:\par Left foot pain\par Calcaneonavicular coalition\par possible proneal tendon injury\par Small dorsal avulsion fracture navicular\par \par Plan:\par Patient evaluated and chart created\par Review xray result with patient , noticed CN coalition on left foot and dorsal avulsion fracture of navicualr\par Ordered Left ankle MRI to assess rearfoot\par Reefer to Marco Antonio Goldberg paresthetic foot CAM boot on left foot  to immobile left foot\par Patient can take Advil for pain relief prn\par Briefly discussed possible surgical excision if true coalition\par RTC 1 week\par

## 2021-05-13 DIAGNOSIS — S92.255A NONDISPLACED FRACTURE OF NAVICULAR [SCAPHOID] OF LEFT FOOT, INITIAL ENCOUNTER FOR CLOSED FRACTURE: ICD-10-CM

## 2021-05-13 DIAGNOSIS — M79.672 PAIN IN LEFT FOOT: ICD-10-CM

## 2021-05-13 DIAGNOSIS — Q66.89 OTHER SPECIFIED CONGENITAL DEFORMITIES OF FEET: ICD-10-CM

## 2021-05-18 ENCOUNTER — NON-APPOINTMENT (OUTPATIENT)
Age: 41
End: 2021-05-18

## 2021-05-25 ENCOUNTER — OUTPATIENT (OUTPATIENT)
Dept: OUTPATIENT SERVICES | Facility: HOSPITAL | Age: 41
LOS: 1 days | End: 2021-05-25
Payer: MEDICAID

## 2021-05-25 ENCOUNTER — APPOINTMENT (OUTPATIENT)
Dept: MRI IMAGING | Facility: HOSPITAL | Age: 41
End: 2021-05-25
Payer: COMMERCIAL

## 2021-05-25 DIAGNOSIS — M79.673 PAIN IN UNSPECIFIED FOOT: ICD-10-CM

## 2021-05-25 DIAGNOSIS — M79.672 PAIN IN LEFT FOOT: ICD-10-CM

## 2021-05-25 PROCEDURE — 73721 MRI JNT OF LWR EXTRE W/O DYE: CPT

## 2021-05-25 PROCEDURE — 73721 MRI JNT OF LWR EXTRE W/O DYE: CPT | Mod: 26,LT

## 2021-06-02 ENCOUNTER — APPOINTMENT (OUTPATIENT)
Age: 41
End: 2021-06-02

## 2021-06-02 NOTE — ED PROVIDER NOTE - CARE PLAN
refusing medication
Pt has two insulin management medications at bedside from home; refusing to let us take it
refusing haprin
Principal Discharge DX:	Encounter for laboratory testing for COVID-19 virus  Secondary Diagnosis:	Wheeze

## 2021-09-07 NOTE — ED ADULT NURSE NOTE - PERIPHERAL VASCULAR
-- DO NOT REPLY / DO NOT REPLY ALL --  -- Message is from the [facility]--    Referral Request  Name of Specialist: Please recommend a specialist   Provider's specialty: Rheumatology    Medical condition for referral: nerve damage/ joint pain    Is this a NEW request?: yes      Referral ordered by: Dr Moreira      Insurance type:       Payor: [facility] MEDICAID / Plan: [facility] MEDICAID HMO / Product Type: T19 HMO      Preferred Delivery Method - The patient will like to have the referral mailed to his home     Caller Information       Type Contact Phone    09/07/2021 03:19 PM CDT Phone (Incoming) Andrea Marsh (Self) [phone] (M)          Alternative phone number: [phone]    Turnaround time given to caller:   \"This message will be sent to [state Provider's full name]. The clinical team will return your call as soon as they review your message. Typically, it takes 3 business days to process referral requests.\"   - - -

## 2021-10-15 ENCOUNTER — EMERGENCY (EMERGENCY)
Facility: HOSPITAL | Age: 41
LOS: 1 days | Discharge: HOME | End: 2021-10-15
Attending: EMERGENCY MEDICINE
Payer: MEDICAID

## 2021-10-15 VITALS
TEMPERATURE: 98 F | HEART RATE: 81 BPM | RESPIRATION RATE: 17 BRPM | SYSTOLIC BLOOD PRESSURE: 102 MMHG | OXYGEN SATURATION: 97 % | DIASTOLIC BLOOD PRESSURE: 66 MMHG | HEIGHT: 64.57 IN | WEIGHT: 119.93 LBS

## 2021-10-15 LAB
APPEARANCE UR: CLEAR — SIGNIFICANT CHANGE UP
BILIRUB UR-MCNC: NEGATIVE — SIGNIFICANT CHANGE UP
COLOR SPEC: YELLOW — SIGNIFICANT CHANGE UP
DIFF PNL FLD: NEGATIVE — SIGNIFICANT CHANGE UP
GLUCOSE UR QL: NEGATIVE — SIGNIFICANT CHANGE UP
HCG UR QL: NEGATIVE — SIGNIFICANT CHANGE UP
KETONES UR-MCNC: NEGATIVE — SIGNIFICANT CHANGE UP
LEUKOCYTE ESTERASE UR-ACNC: NEGATIVE — SIGNIFICANT CHANGE UP
NITRITE UR-MCNC: NEGATIVE — SIGNIFICANT CHANGE UP
PH UR: 6 — SIGNIFICANT CHANGE UP (ref 5–8)
PROT UR-MCNC: NEGATIVE — SIGNIFICANT CHANGE UP
SP GR SPEC: 1 — LOW (ref 1.01–1.02)
UROBILINOGEN FLD QL: NEGATIVE — SIGNIFICANT CHANGE UP

## 2021-10-15 PROCEDURE — 72100 X-RAY EXAM L-S SPINE 2/3 VWS: CPT

## 2021-10-15 PROCEDURE — 72100 X-RAY EXAM L-S SPINE 2/3 VWS: CPT | Mod: 26

## 2021-10-15 PROCEDURE — 81025 URINE PREGNANCY TEST: CPT

## 2021-10-15 PROCEDURE — 99283 EMERGENCY DEPT VISIT LOW MDM: CPT | Mod: 25

## 2021-10-15 PROCEDURE — 81003 URINALYSIS AUTO W/O SCOPE: CPT

## 2021-10-15 PROCEDURE — 99284 EMERGENCY DEPT VISIT MOD MDM: CPT

## 2021-10-15 RX ORDER — IBUPROFEN 200 MG
1 TABLET ORAL
Qty: 56 | Refills: 0
Start: 2021-10-15 | End: 2021-10-28

## 2021-10-15 RX ORDER — DIAZEPAM 5 MG
1 TABLET ORAL
Qty: 3 | Refills: 0
Start: 2021-10-15 | End: 2021-10-17

## 2021-10-15 RX ORDER — IBUPROFEN 200 MG
600 TABLET ORAL ONCE
Refills: 0 | Status: COMPLETED | OUTPATIENT
Start: 2021-10-15 | End: 2021-10-15

## 2021-10-15 RX ORDER — METHOCARBAMOL 500 MG/1
2 TABLET, FILM COATED ORAL
Qty: 30 | Refills: 0
Start: 2021-10-15 | End: 2021-10-19

## 2021-10-15 RX ORDER — LIDOCAINE 4 G/100G
1 CREAM TOPICAL ONCE
Refills: 0 | Status: COMPLETED | OUTPATIENT
Start: 2021-10-15 | End: 2021-10-15

## 2021-10-15 RX ORDER — METHOCARBAMOL 500 MG/1
750 TABLET, FILM COATED ORAL ONCE
Refills: 0 | Status: COMPLETED | OUTPATIENT
Start: 2021-10-15 | End: 2021-10-15

## 2021-10-15 RX ORDER — OXYCODONE AND ACETAMINOPHEN 5; 325 MG/1; MG/1
1 TABLET ORAL ONCE
Refills: 0 | Status: DISCONTINUED | OUTPATIENT
Start: 2021-10-15 | End: 2021-10-15

## 2021-10-15 RX ADMIN — LIDOCAINE 1 PATCH: 4 CREAM TOPICAL at 14:41

## 2021-10-15 RX ADMIN — METHOCARBAMOL 750 MILLIGRAM(S): 500 TABLET, FILM COATED ORAL at 14:41

## 2021-10-15 RX ADMIN — OXYCODONE AND ACETAMINOPHEN 1 TABLET(S): 5; 325 TABLET ORAL at 16:00

## 2021-10-15 RX ADMIN — Medication 600 MILLIGRAM(S): at 15:19

## 2021-10-15 RX ADMIN — Medication 600 MILLIGRAM(S): at 14:41

## 2021-10-15 RX ADMIN — OXYCODONE AND ACETAMINOPHEN 1 TABLET(S): 5; 325 TABLET ORAL at 15:10

## 2021-10-15 NOTE — ED PROVIDER NOTE - PROGRESS NOTE DETAILS
mcfadden: still in significant amt of pain. percocet given, re-assess. neurologically stable Orr: feels better but still in pain. ambulating. xr no acute fracture. mild spondylosis  dx lumbar spasm of right. rx valium, motrin and robaxin. please use heat packs to area 20 mins 3x/day,  take motrin 600mg every 6 hrs as needed, tylenol 650mg every 4 hrs as needed, stay active, no heavy lifting and return if symptoms worsens.see your MD for physical therapy. left ambulatory.  return precautions given.

## 2021-10-15 NOTE — ED ADULT NURSE NOTE - OBJECTIVE STATEMENT
As per pt, c/o R sided lower back pain radiating down the R leg x4 days w/ increasing pain today. No obvious traumas or injuries noted. PT denies all other symptoms.

## 2021-10-15 NOTE — ED PROVIDER NOTE - PATIENT PORTAL LINK FT
You can access the FollowMyHealth Patient Portal offered by [facility] by registering at the following website: [email or web address]. By joining [employer]’s FollowMyHealth portal, you will also be able to view your health information using other applications (apps) compatible with our system.

## 2021-10-15 NOTE — ED PROVIDER NOTE - CLINICAL SUMMARY MEDICAL DECISION MAKING FREE TEXT BOX
41 yr old female with no hx presents to ed c/o right lower back pain x 2-3 days. pt denies any trauma, instrumentation, fever, numbness or tingling. pain radiates from right lumbar down to popliteal. worse with movement. no incontinence, no focal weakness.    lumbar strain possibly early sciatica r/o uti- ua, lumbar xr, motrin, tylenol, lidoderm, robaxin, re-assess please use heat packs to area 20 mins 3x/day,  take motrin 600mg every 6 hrs as needed, tylenol 650mg every 4 hrs as needed, stay active, no heavy lifting and return if symptoms worsens. see your MD for physical therapy. 41 yr old female with hx chronic lower back pain and HHA presents to ed c/o right lower back pain x 2-3 days. pt denies any trauma, instrumentation, fever, numbness or tingling. pain radiates from right lumbar down to popliteal. worse with movement. no incontinence, no focal weakness.    lumbar strain possibly early sciatica r/o uti- ua, lumbar xr, motrin, tylenol, lidoderm, robaxin, re-assess please use heat packs to area 20 mins 3x/day,  take motrin 600mg every 6 hrs as needed, tylenol 650mg every 4 hrs as needed, stay active, no heavy lifting and return if symptoms worsens. see your MD for physical therapy.

## 2021-10-15 NOTE — ED ADULT NURSE NOTE - CHPI ED NUR SYMPTOMS NEG
no anorexia/no bladder dysfunction/no constipation/no fatigue/no motor function loss/no neck tenderness/no numbness/no tingling

## 2021-10-15 NOTE — ED PROVIDER NOTE - MUSCULOSKELETAL, MLM
Spine appears normal, range of motion is not limited, no joint tenderness. right paraspinal lumbar ttp, no erythema

## 2021-10-15 NOTE — ED PROVIDER NOTE - NSFOLLOWUPINSTRUCTIONS_ED_ALL_ED_FT
Utilice compresas térmicas en el área de 20 minutos 3 veces al día, tome motrin 600 mg cada 6 horas según sea necesario, tylenol 650 mg cada 4 horas según sea necesario, manténgase activo, no levante objetos pesados ??y regrese si los síntomas empeoran. Consulte a pierce médico para fisioterapia.    Distensión lumbar    Lumbar Strain      Ally distensión lumbar, a veces llamada distensión de la parte baja de la espalda, es un estiramiento o un desgarro en un músculo o en los cordones rupa de tejido que unen el músculo al hueso (tendones) en la parte inferior de la espalda (columna lumbar). Livia tipo de lesión ocurre cuando los músculos o los tendones se desgarran o se estiran más allá de pierce límite.    Las distensiones lumbares puede ser de leves a graves. Las distensiones leves pueden involucrar el estiramiento de un músculo o un tendón sin desgarrarlo. Estas pueden curarse en 1 o 2 semanas. Las distensiones más graves involucran el desgarro de fibras o tendones musculares. Estas causarán más dolor y pueden demorar entre 6 y 8 semanas en curarse.    ¿Cuáles son las causas?  Esta afección puede ser causada por lo siguiente:  •Traumatismo, debido, por ejemplo, a ally caída o a un golpe en el cuerpo.  •Torsión o hiperdistensión de la espalda. [place] puede ser el resultado de realizar actividades que requieren mucha energía, kita levantar objetos pesados.  ¿Qué incrementa el riesgo?  Esta lesión es más frecuente en las siguientes personas:  •Atletas.  •Personas con obesidad.  •Personas que hacen movimientos repetitivos de levantar, inclinarse u otros movimientos que involucran la espalda.  ¿Cuáles son los signos o los síntomas?  Los síntomas de esta afección pueden incluir los siguientes:  •Dolor vani o sordo en la parte inferior de la espalda que no desaparece. El dolor se puede extender hacia las nalgas.  •Rigidez o amplitud de movimientos limitada.  •Tiene contracciones musculares súbitas (espasmos).    ¿Cómo se diagnostica?  Esta afección se puede diagnosticar en función de lo siguiente:  •Mary síntomas.  •Mary antecedentes médicos.  •Un examen físico.  •Pruebas de diagnóstico por imágenes, por ejemplo:  •Radiografías.  •Resonancia magnética (RM).    ¿Cómo se trata?  El tratamiento de esta afección puede incluir lo siguiente:  •Reposo.  •Aplicar calor y frío en la kendall afectada.      •Medicamentos de venta rob para aliviar el dolor y la inflamación, kita antiinflamatorios no esteroideos (EZRA).  •Los analgésicos recetados y los relajantes musculares pueden ser necesarios griffin un corto tiempo.  •Realizar fisioterapia.  Siga estas instrucciones en pierce casa:    Control del dolor, el entumecimiento y la hinchazón   •Si se lo indican, aplique hielo en la kendall lesionada griffin las primeras 24 horas después de producida la lesión.  •Ponga el hielo en ally bolsa plástica.  •Coloque ally toalla entre la piel y la bolsa.  •Coloque el hielo griffin 20 minutos, 2 a 3 veces por día.  •Si se lo indican, aplique calor en la kendall afectada con la frecuencia que le haya indicado el médico. Use la zabrina de calor que el médico le recomiende, kita ally compresa de calor húmedo o ally almohadilla térmica.  •Coloque ally toalla entre la piel y la zabrina de calor.  •Aplique calor griffin 20 a 30 minutos.  •Retire la zabrina de calor si la piel se pone de color yu brillante. [place] es especialmente importante si no puede sentir dolor, calor o frío. Puede correr un riesgo mayor de sufrir quemaduras.      Actividad     •Descanse y retome mary actividades normales kita se lo haya indicado el médico. Pregúntele al médico qué actividades son seguras para usted.  •Nydia ejercicio kita se lo haya indicado el médico.    Medicamentos     •[place] los medicamentos de venta rob y los recetados solamente kita se lo haya indicado el médico.    •Pregúntele al médico si el medicamento recetado:  •Hace que sea necesario que evite conducir o usar maquinaria pesada.    •Puede causarle estreñimiento. Es posible que tenga que codi estas medidas para prevenir o tratar el estreñimiento:  •Beber suficiente líquido kita para mantener la orina de color amarillo pálido.    •Codi medicamentos recetados o de venta rob.    •Consumir alimentos ricos en fibra, kita frijoles, cereales integrales, y frutas y verduras frescas.    •Limitar pierce consumo de alimentos ricos en grasa y azúcares procesados, kita los alimentos fritos o dulces.    Prevención de lesiones   Para prevenir ally lesión futura en la parte baja de la espalda:  •Siempre nydia un precalentamiento adecuado antes de la actividad física o de practicar deportes.    •Relájese y elongue después de hacer actividad física.    •Practique deportes y levante objetos pesados de forma correcta. Flexione las rodillas antes de levantar objetos pesados.    •Adopte ally buena postura mientras esté sentado y de pie.    •Manténgase en buen estado físico y con un peso saludable.  •Nydia por lo menos 150 minutos de ejercicios de intensidad moderada cada semana, kita caminar a paso ligero o hacer gimnasia acuática.    •Nydia ejercicios de fuerza al menos 2 veces por semana.    Indicaciones generales     • No consuma ningún producto que contenga nicotina o tabaco, kita cigarrillos, cigarrillos electrónicos y tabaco de mascar. Si necesita ayuda para dejar de consumir, consulte al médico.  •Concurra a todas las visitas de control kita se lo haya indicado el médico. [place] es importante.    Comuníquese con un médico si:  •El dolor de espalda no mejora después de 6 semanas de tratamiento.  •Mary síntomas empeoran.    Solicite ayuda inmediatamente si:    •El dolor de espalda es muy intenso.  •Nota que no puede pararse o caminar.  •Siente dolor en las piernas.  •Siente debilidad en las nalgas o en las piernas.  •Tiene dificultad para controlar la micción o los movimientos intestinales.  •Tiene micción frecuente, dolorosa o con mary alice.  •Tiene ally temperatura por encima de 101.0 °F (38.3 °C)    Resumen    •Ally distensión lumbar, a veces llamada distensión de la parte baja de la espalda, es un estiramiento o un desgarro en un músculo o en los cordones rupa de tejido que unen el músculo al hueso (tendones) en la parte inferior de la espalda (columna lumbar).  •Livia tipo de lesión ocurre cuando los músculos o los tendones se desgarran o se estiran más allá de pierce límite.  •Descanse y retome mary actividades normales kita se lo haya indicado el médico. Si se lo indican, aplique calor y hielo en la kendall afectada con la frecuencia que le haya indicado el médico.  •[place] los medicamentos de venta rob y los recetados solamente kita se lo haya indicado el médico.  •Comuníquese con un médico si tiene síntomas nuevos o los síntomas empeoran.

## 2021-10-15 NOTE — ED PROVIDER NOTE - OBJECTIVE STATEMENT
41 yr old female with no hx presents to ed c/o right lower back pain x 2-3 days. pt 41 yr old female with no hx presents to ed c/o right lower back pain x 2-3 days. pt denies any trauma, instrumentation, fever, numbness or tingling. pain radiates from right lumbar down to popliteal. worse with movement. no incontinence, no focal weakness. 41 yr old female with hx chronic lower back pain and HHA presents to ed c/o right lower back pain x 2-3 days. pt denies any trauma, instrumentation, fever, numbness or tingling. pain radiates from right lumbar down to popliteal. worse with movement. no incontinence, no focal weakness.

## 2021-10-16 ENCOUNTER — EMERGENCY (EMERGENCY)
Facility: HOSPITAL | Age: 41
LOS: 1 days | Discharge: HOME | End: 2021-10-16
Attending: STUDENT IN AN ORGANIZED HEALTH CARE EDUCATION/TRAINING PROGRAM
Payer: MEDICAID

## 2021-10-16 VITALS
OXYGEN SATURATION: 98 % | SYSTOLIC BLOOD PRESSURE: 97 MMHG | HEART RATE: 64 BPM | WEIGHT: 119.93 LBS | DIASTOLIC BLOOD PRESSURE: 49 MMHG | TEMPERATURE: 99 F | HEIGHT: 64.57 IN | RESPIRATION RATE: 18 BRPM

## 2021-10-16 LAB
ALBUMIN SERPL ELPH-MCNC: 3.5 G/DL — SIGNIFICANT CHANGE UP (ref 3.5–5)
ALP SERPL-CCNC: 46 U/L — SIGNIFICANT CHANGE UP (ref 40–120)
ALT FLD-CCNC: 29 U/L DA — SIGNIFICANT CHANGE UP (ref 10–60)
ANION GAP SERPL CALC-SCNC: 5 MMOL/L — SIGNIFICANT CHANGE UP (ref 5–17)
APPEARANCE UR: CLEAR — SIGNIFICANT CHANGE UP
AST SERPL-CCNC: 21 U/L — SIGNIFICANT CHANGE UP (ref 10–40)
BACTERIA # UR AUTO: ABNORMAL /HPF
BASOPHILS # BLD AUTO: 0.06 K/UL — SIGNIFICANT CHANGE UP (ref 0–0.2)
BASOPHILS NFR BLD AUTO: 1 % — SIGNIFICANT CHANGE UP (ref 0–2)
BILIRUB SERPL-MCNC: 0.3 MG/DL — SIGNIFICANT CHANGE UP (ref 0.2–1.2)
BILIRUB UR-MCNC: NEGATIVE — SIGNIFICANT CHANGE UP
BUN SERPL-MCNC: 24 MG/DL — HIGH (ref 7–18)
CALCIUM SERPL-MCNC: 8.8 MG/DL — SIGNIFICANT CHANGE UP (ref 8.4–10.5)
CHLORIDE SERPL-SCNC: 106 MMOL/L — SIGNIFICANT CHANGE UP (ref 96–108)
CO2 SERPL-SCNC: 27 MMOL/L — SIGNIFICANT CHANGE UP (ref 22–31)
COLOR SPEC: YELLOW — SIGNIFICANT CHANGE UP
CREAT SERPL-MCNC: 0.72 MG/DL — SIGNIFICANT CHANGE UP (ref 0.5–1.3)
DIFF PNL FLD: NEGATIVE — SIGNIFICANT CHANGE UP
EOSINOPHIL # BLD AUTO: 0.06 K/UL — SIGNIFICANT CHANGE UP (ref 0–0.5)
EOSINOPHIL NFR BLD AUTO: 1 % — SIGNIFICANT CHANGE UP (ref 0–6)
EPI CELLS # UR: SIGNIFICANT CHANGE UP /HPF
GLUCOSE SERPL-MCNC: 75 MG/DL — SIGNIFICANT CHANGE UP (ref 70–99)
GLUCOSE UR QL: NEGATIVE — SIGNIFICANT CHANGE UP
HCG SERPL-ACNC: <1 MIU/ML — SIGNIFICANT CHANGE UP
HCT VFR BLD CALC: 37.4 % — SIGNIFICANT CHANGE UP (ref 34.5–45)
HGB BLD-MCNC: 12.2 G/DL — SIGNIFICANT CHANGE UP (ref 11.5–15.5)
HYALINE CASTS # UR AUTO: ABNORMAL /LPF
KETONES UR-MCNC: NEGATIVE — SIGNIFICANT CHANGE UP
LEUKOCYTE ESTERASE UR-ACNC: ABNORMAL
LYMPHOCYTES # BLD AUTO: 3.07 K/UL — SIGNIFICANT CHANGE UP (ref 1–3.3)
LYMPHOCYTES # BLD AUTO: 48 % — HIGH (ref 13–44)
MCHC RBC-ENTMCNC: 28 PG — SIGNIFICANT CHANGE UP (ref 27–34)
MCHC RBC-ENTMCNC: 32.6 GM/DL — SIGNIFICANT CHANGE UP (ref 32–36)
MCV RBC AUTO: 85.8 FL — SIGNIFICANT CHANGE UP (ref 80–100)
MONOCYTES # BLD AUTO: 0.45 K/UL — SIGNIFICANT CHANGE UP (ref 0–0.9)
MONOCYTES NFR BLD AUTO: 7 % — SIGNIFICANT CHANGE UP (ref 2–14)
NEUTROPHILS # BLD AUTO: 1.09 K/UL — LOW (ref 1.8–7.4)
NEUTROPHILS NFR BLD AUTO: 16 % — LOW (ref 43–77)
NITRITE UR-MCNC: NEGATIVE — SIGNIFICANT CHANGE UP
PH UR: 6.5 — SIGNIFICANT CHANGE UP (ref 5–8)
PLATELET # BLD AUTO: 213 K/UL — SIGNIFICANT CHANGE UP (ref 150–400)
POTASSIUM SERPL-MCNC: 4.4 MMOL/L — SIGNIFICANT CHANGE UP (ref 3.5–5.3)
POTASSIUM SERPL-SCNC: 4.4 MMOL/L — SIGNIFICANT CHANGE UP (ref 3.5–5.3)
PROT SERPL-MCNC: 6.5 G/DL — SIGNIFICANT CHANGE UP (ref 6–8.3)
PROT UR-MCNC: 15
RBC # BLD: 4.36 M/UL — SIGNIFICANT CHANGE UP (ref 3.8–5.2)
RBC # FLD: 15.3 % — HIGH (ref 10.3–14.5)
RBC CASTS # UR COMP ASSIST: SIGNIFICANT CHANGE UP /HPF (ref 0–2)
SODIUM SERPL-SCNC: 138 MMOL/L — SIGNIFICANT CHANGE UP (ref 135–145)
SP GR SPEC: 1.01 — SIGNIFICANT CHANGE UP (ref 1.01–1.02)
UROBILINOGEN FLD QL: NEGATIVE — SIGNIFICANT CHANGE UP
WBC # BLD: 6.39 K/UL — SIGNIFICANT CHANGE UP (ref 3.8–10.5)
WBC # FLD AUTO: 6.39 K/UL — SIGNIFICANT CHANGE UP (ref 3.8–10.5)
WBC UR QL: SIGNIFICANT CHANGE UP /HPF (ref 0–5)

## 2021-10-16 PROCEDURE — 99285 EMERGENCY DEPT VISIT HI MDM: CPT

## 2021-10-16 PROCEDURE — 72132 CT LUMBAR SPINE W/DYE: CPT | Mod: 26,MA

## 2021-10-16 PROCEDURE — 74177 CT ABD & PELVIS W/CONTRAST: CPT | Mod: 26,MA

## 2021-10-16 RX ORDER — KETOROLAC TROMETHAMINE 30 MG/ML
30 SYRINGE (ML) INJECTION ONCE
Refills: 0 | Status: DISCONTINUED | OUTPATIENT
Start: 2021-10-16 | End: 2021-10-16

## 2021-10-16 RX ORDER — LIDOCAINE 4 G/100G
1 CREAM TOPICAL ONCE
Refills: 0 | Status: COMPLETED | OUTPATIENT
Start: 2021-10-16 | End: 2021-10-16

## 2021-10-16 RX ORDER — DIAZEPAM 5 MG
5 TABLET ORAL ONCE
Refills: 0 | Status: DISCONTINUED | OUTPATIENT
Start: 2021-10-16 | End: 2021-10-16

## 2021-10-16 RX ADMIN — Medication 30 MILLIGRAM(S): at 21:31

## 2021-10-16 RX ADMIN — Medication 5 MILLIGRAM(S): at 21:01

## 2021-10-16 RX ADMIN — LIDOCAINE 1 PATCH: 4 CREAM TOPICAL at 21:00

## 2021-10-16 RX ADMIN — Medication 30 MILLIGRAM(S): at 21:01

## 2021-10-16 NOTE — ED PROVIDER NOTE - NSFOLLOWUPINSTRUCTIONS_ED_ALL_ED_FT
Follow up with Dr. Ni on Monday 10/18/21 at noon at the following address:    Dr. Ni   [address] [place] [address]. [address]   [place] [address]  Telephone: [phone]    Take you Medrol Dose Pack as prescribed on the bottle.     Return to the ER if you developed worsening back pain, leg pain, numbness, weakness, difficulty urination.      Frank un seguimiento con el Dr. Ni el lunes 18/10/21 al mediodía en la siguiente dirección:    Dr. Ni  [address]  [place] [address]  Teléfono: [phone]    Convoy pierce paquete de dosis de Medrol según lo prescrito en el frasco.    Regrese a la jareth de emergencias si desarrolló un empeoramiento del dolor de espalda, dolor en las piernas, entumecimiento, debilidad, dificultad para orinar

## 2021-10-16 NOTE — ED ADULT TRIAGE NOTE - CHIEF COMPLAINT QUOTE
Pt compliant of right side buttock pain going down right leg that started on Sunday, right  side stomach pain and last time urinated was 7am

## 2021-10-16 NOTE — ED PROVIDER NOTE - OBJECTIVE STATEMENT
41 y.o presenting with right back pain with radiation down right leg. endorses history of similar in the past. current pain x 6 days. endorses difficulty with urination today 2/2 to back pain when urinating. denies weakness, numbness, drug use, fever, trauma, fall, saddle numbness.

## 2021-10-16 NOTE — ED PROVIDER NOTE - CLINICAL SUMMARY MEDICAL DECISION MAKING FREE TEXT BOX
Patient presenting with back pain with radiation. + straight leg, likely sciatica. noting difficulty with urination but rectal tone intact. no saddle anaesthesia on exam, no fever, drug use or trauma, less likely caude. will obtain lab, ct, pain control and reassess

## 2021-10-16 NOTE — ED PROVIDER NOTE - PROGRESS NOTE DETAILS
Pt feeling much better, voiding spontaneously without residual urine, ambulating without pain. Reviewed case and imaging with NSG Dr. Ni of [facility]. She states the pt can be d/bebeto on medrol dose pack to f/u with her on Monday at noon. Strict return precautions given.

## 2021-10-16 NOTE — ED PROVIDER NOTE - PATIENT PORTAL LINK FT
You can access the FollowMyHealth Patient Portal offered by [facility] by registering at the following website: [email or web address]. By joining [facility]’s FollowMyHealth portal, you will also be able to view your health information using other applications (apps) compatible with our system.

## 2021-10-17 VITALS
DIASTOLIC BLOOD PRESSURE: 61 MMHG | OXYGEN SATURATION: 98 % | HEART RATE: 66 BPM | RESPIRATION RATE: 16 BRPM | SYSTOLIC BLOOD PRESSURE: 98 MMHG | TEMPERATURE: 99 F

## 2021-10-17 PROCEDURE — 81001 URINALYSIS AUTO W/SCOPE: CPT

## 2021-10-17 PROCEDURE — 87086 URINE CULTURE/COLONY COUNT: CPT

## 2021-10-17 PROCEDURE — 84702 CHORIONIC GONADOTROPIN TEST: CPT

## 2021-10-17 PROCEDURE — 96374 THER/PROPH/DIAG INJ IV PUSH: CPT | Mod: XU

## 2021-10-17 PROCEDURE — 99284 EMERGENCY DEPT VISIT MOD MDM: CPT | Mod: 25

## 2021-10-17 PROCEDURE — 80053 COMPREHEN METABOLIC PANEL: CPT

## 2021-10-17 PROCEDURE — 72132 CT LUMBAR SPINE W/DYE: CPT | Mod: MA

## 2021-10-17 PROCEDURE — 36415 COLL VENOUS BLD VENIPUNCTURE: CPT

## 2021-10-17 PROCEDURE — 74177 CT ABD & PELVIS W/CONTRAST: CPT | Mod: MA

## 2021-10-17 PROCEDURE — 85025 COMPLETE CBC W/AUTO DIFF WBC: CPT

## 2021-10-17 RX ORDER — ACETAMINOPHEN 500 MG
975 TABLET ORAL ONCE
Refills: 0 | Status: COMPLETED | OUTPATIENT
Start: 2021-10-17 | End: 2021-10-17

## 2021-10-17 RX ADMIN — Medication 975 MILLIGRAM(S): at 00:43

## 2021-10-17 RX ADMIN — Medication 975 MILLIGRAM(S): at 02:13

## 2021-10-17 NOTE — ED ADULT NURSE NOTE - OBJECTIVE STATEMENT
Patient presents to the ED complaining of right buttock pain. She states it started last Sunday. Pain worsens with physical activity. States she is a . She is alert and oriented x3, lying on stretcher.

## 2021-10-18 ENCOUNTER — APPOINTMENT (OUTPATIENT)
Dept: NEUROSURGERY | Facility: CLINIC | Age: 41
End: 2021-10-18
Payer: MEDICAID

## 2021-10-18 VITALS
BODY MASS INDEX: 19.99 KG/M2 | TEMPERATURE: 97 F | WEIGHT: 120 LBS | OXYGEN SATURATION: 99 % | HEART RATE: 64 BPM | SYSTOLIC BLOOD PRESSURE: 93 MMHG | HEIGHT: 65 IN | DIASTOLIC BLOOD PRESSURE: 62 MMHG

## 2021-10-18 DIAGNOSIS — M54.41 LUMBAGO WITH SCIATICA, RIGHT SIDE: ICD-10-CM

## 2021-10-18 LAB
CULTURE RESULTS: NO GROWTH — SIGNIFICANT CHANGE UP
SPECIMEN SOURCE: SIGNIFICANT CHANGE UP

## 2021-10-18 PROCEDURE — 99203 OFFICE O/P NEW LOW 30 MIN: CPT

## 2021-10-18 RX ORDER — METHOCARBAMOL 500 MG/1
500 TABLET, FILM COATED ORAL
Qty: 30 | Refills: 0 | Status: ACTIVE | COMMUNITY
Start: 2021-10-18 | End: 1900-01-01

## 2021-10-18 RX ORDER — QUETIAPINE 400 MG/1
TABLET, FILM COATED ORAL
Refills: 0 | Status: ACTIVE | COMMUNITY

## 2021-10-18 RX ORDER — GABAPENTIN 300 MG/1
300 CAPSULE ORAL 3 TIMES DAILY
Qty: 90 | Refills: 3 | Status: ACTIVE | COMMUNITY
Start: 2021-10-18 | End: 1900-01-01

## 2021-10-20 NOTE — PHYSICAL EXAM
[General Appearance - Alert] : alert [General Appearance - In No Acute Distress] : in no acute distress [General Appearance - Well Nourished] : well nourished [General Appearance - Well-Appearing] : healthy appearing [Oriented To Time, Place, And Person] : oriented to person, place, and time [Impaired Insight] : insight and judgment were intact [Affect] : the affect was normal [Memory Recent] : recent memory was not impaired [Person] : oriented to person [Place] : oriented to place [Time] : oriented to time [Short Term Intact] : short term memory intact [Motor Tone] : muscle tone was normal in all four extremities [Motor Strength] : muscle strength was normal in all four extremities [Sensation Tactile Decrease] : light touch was intact [Sensation Pain / Temperature Decrease] : pain and temperature was intact [Balance] : balance was intact [2+] : Brachioradialis left 2+ [3+] : Ankle jerk left 3+ [Sclera] : the sclera and conjunctiva were normal [PERRL With Normal Accommodation] : pupils were equal in size, round, reactive to light, with normal accommodation [Outer Ear] : the ears and nose were normal in appearance [Both Tympanic Membranes Were Examined] : both tympanic membranes were normal [Neck Appearance] : the appearance of the neck was normal [] : no respiratory distress [Respiration, Rhythm And Depth] : normal respiratory rhythm and effort [Apical Impulse] : the apical impulse was normal [Heart Rate And Rhythm] : heart rate was normal and rhythm regular [Edema] : there was no peripheral edema [No CVA Tenderness] : no ~M costovertebral angle tenderness [Abnormal Walk] : normal gait [Involuntary Movements] : no involuntary movements were seen [Skin Color & Pigmentation] : normal skin color and pigmentation

## 2021-10-29 NOTE — HISTORY OF PRESENT ILLNESS
[FreeTextEntry1] : \par Ms. Rodriguez is a 41 year female who presents to the office complaining of low back pain since 2 weeks. She denies fall/trauma. Pt had an ER visit with right back pain with radiation down right leg. endorses history of similar in the past and had ED visit. The last pain with  difficulty  when urinating. Pt was given Medrol dose pack in the ER and was sent home with instruction to f/u in neurosurgery.  The patient has also received a CT L spine in the ER and was told that there could be a disc bulge  which could be causing this symptoms.\par \par \par Today she presented with back pain which stems from her back through the groin of her right  hip and transcends down her right leg into her foot. Pain is between 5-6/10 with increases while standing and walking.  Pt walk with dragging the right leg and foot. She feels the pain mostly in the back. She feel weakness in her right  leg and unable to walk well. . Pt. denies acute urinary incontinence or retention at this time.\par \par

## 2021-10-29 NOTE — REASON FOR VISIT
[Follow-Up: _____] : a [unfilled] follow-up visit [[employer] ] : provided by [employer]   [FreeTextEntry1] : back pain [Interpreters_IDNumber] : [id number] [Interpreters_FullName] : vicente [TWNoteComboBox1] : [place]

## 2021-10-29 NOTE — RESULTS/DATA
[FreeTextEntry1] : EXAM: CT LUMBAR SPINE IC\par \par EXAM: CT ABDOMEN AND PELVIS IC\par \par \par PROCEDURE DATE: 10/16/2021\par \par \par \par INTERPRETATION: CLINICAL INFORMATION: Back pain and flank pain with radiation.\par \par COMPARISON: 1/24/2018.\par \par CONTRAST/COMPLICATIONS:\par IV Contrast: Omnipaque 350 90 cc administered 10 cc discarded\par Oral Contrast: NONE\par Complications: None reported at time of study completion\par \par PROCEDURE:\par CT of the Abdomen and Pelvis was performed.\par Sagittal and coronal reformats were performed.\par Thin section axial, sagittal, and coronal reformatted images of the lumbar spine are obtained from the CT of the abdomen and pelvis. Three-D reformatted images are also submitted.\par \par FINDINGS:\par LOWER CHEST: Bibasilar dependent atelectasis. Incompletely visualized bilateral breast implants.\par \par LIVER: Within normal limits.\par BILE DUCTS: Normal caliber.\par GALLBLADDER: Within normal limits.\par SPLEEN: Within normal limits.\par PANCREAS: Within normal limits.\par ADRENALS: Within normal limits.\par KIDNEYS/URETERS: Kidneys enhance symmetrically without hydronephrosis or focal renal parenchymal lesion.\par \par BLADDER: Within normal limits.\par REPRODUCTIVE ORGANS: Uterus and right adnexa are unremarkable. Involuting left ovarian cyst/follicle measuring up to 1.9 cm.\par \par BOWEL: No bowel obstruction or overt bowel wall thickening. Appendix is normal. Moderate volume of stool in the colon.\par PERITONEUM: Trace pelvic free fluid. No pneumoperitoneum or loculated collection to suggest abscess. No mesenteric lymphadenopathy.\par VESSELS: Within normal limits.\par RETROPERITONEUM/LYMPH NODES: No lymphadenopathy.\par ABDOMINAL WALL: Injection granulomas in the gluteal and perineal subcutaneous fat.\par BONES: Mild degenerative changes of the spine.\par \par LUMBAR SPINE REFORMATS: Lumbar lordosis is maintained. There is no acute lumbar spine fracture lucency or compression deformity. There is no evidence of traumatic malalignment. There is no paraspinal soft tissue swelling or hematoma. There is intervertebral disc space narrowing with vacuum disc phenomena at L4-L5 and L5-S1. There is a mild disc bulge at L4-L5 with extruded disc material extending superiorly along the dorsal aspect of L4. There is resultant mild segmental canal stenosis in conjunction with mild facet and ligamentous hypertrophy. There is a mild disc bulge indenting on the ventral thecal sac at L5-S1. There is no significant segmental canal stenosis at L5-S1.\par \par IMPRESSION:\par No bowel obstruction or evidence of bowel inflammation. Moderate volume of stool in the colon.\par \par Involuting left ovarian cyst/follicle measuring up to 1.9 cm. Trace pelvic free fluid.\par \par Mild lumbar spondylosis at L4-L5 and L5-S1 with mild disc bulge at L4-L5 and extruded disc material extending superiorly along the dorsal aspect of L4. Resultant mild segmental canal stenosis at L4-L5 and conjunction with mild facet and ligamentous hypertrophy. MRI of the lumbar spine should be considered to further evaluate.\par \par --- End of Report ---\par \par

## 2021-10-29 NOTE — ASSESSMENT
[FreeTextEntry1] : \par \par IMPRESSION:\par \par Ms. Rodriguez is a 41 year female,  who had an ER visit with complaining of low back pain and urinary retention, which resolved. Pt had similar prior episodes with back pain and urinary issues. CT L spine revealed various level of disc bulges. Will need MRI to look more closely. \par \par \par PLAN:\par Gabapentin 300 mg TID\par Methocarbamol 500 mg PRN spasm TID\par PT for back for strengthening and modalities , 2-3 times/week x 8 weeks. \par MRI Lumbar spine w/o contrast\par Pt advised to stay away from body building exercises and competitions for now.  \par F/U after 2 weeks

## 2021-11-01 NOTE — ED ADULT TRIAGE NOTE - LOCATION:
Anesthesia Pre Eval Note    Exercise stress test with myocardial perfusion scan 4/2021  Stress EKG test portion of nuclear stress test     Indications: 71-year-old with history of chest pain and dyspnea on exertion in the context of hypertension chronic kidney disease and hyperlipidemia     The resting twelve-lead electrocardiogram reveals a normal tracing without change in the preexercise maneuvers.     He was exercised on the Elmo protocol and performed a total of 6 minutes achieving a peak heart rate of 127 representing 85% age-predicted maximal heart rate.     Blood pressure sen from 112/72 maximum of 152/70.     Cooldown was begun secondary to fatigue     There were no complaints of chest pain.  There were rare singly occurring PVCs.     There were no ischemic ST segment changes.     Conclusion:  1.  Fair exercise capacity with significant workload achieved but no signs or symptoms of myocardial ischemia  2.  Rare PVCs  3.  Nuclear report to follow     MYOCARDIAL PERFUSION IMAGING     The patient is a 71 year old who underwent a stress test. Patient height 69 inches, weight 225 lbs.     A single isotope stress only myocardial perfusion imaging was performed after exercise stress utilizing a elmo protocol.  30.0 millicuries of technetium 99m Myoview were administered intravenously via the right antecubital vein for the post stress imaging. The supine and prone post stress images were  aligned for comparison.     Post stress myocardial perfusion imaging demonstrates normal perfusion.      The post stress gated SPECT had a calculated ejection fraction of 79 %. The wall motion is normal .      IMPRESSION  normal stress only myocardial perfusion imaging .  Compared to prior study no prior study available for comparison..      Anesthesia ROS/Med Hx    Overall Review:  EKG was reviewed, Echo was reviewed and Stress test was reviewed     Anesthetic Complication History:    No History of difficult airway  No history  of malignant hyperthermia  No PONVNo history of awareness of surgery under anesthesia    Pulmonary Review:    Negative for sleep apnea   Negative for COPD History of: no asthma -   Negative for recent URI   The patient is not a smoker.    Neuro/Psych Review:  Negative for seizures   Negative for TIA  Negative for CVA    Cardiovascular Review:  Exercise tolerance: good (>4 METS)  Negative for CHF  Negative for cardiomyopathy  Negative for past MI  Negative for CAD  Negative for CABG/stent    Negative for dysrhythmias  Positive for hypertension  Positive for hyperlipidemia    GI/HEPATIC/RENAL Review:    Positive for GERDNegative for liver disease  Positive for renal disease    End/Other Review:  Negative for diabetes  Positive for obesity   Negative for hypothyroidism  Negative for hyperthyroidism  Additional Results:  EKG:  No results found for this or any previous visit (from the past 4464 hour(s)). Echo:  Ejection Fraction       Date                     Value               Ref Range           Status                04/19/2021               79                  >/=50%              Final            ----------Stress Test:  No valid procedures specified.     ALLERGIES:  No Known Allergies       Lab Results       Component                Value               Date                       WBC                      4.2                 02/09/2021                 WBC                      3.7 (L)             08/15/2020                 RBC                      4.70                02/09/2021                 RBC                      4.64                08/15/2020                 HGB                      14.6                02/09/2021                 HGB                      14.1                08/15/2020                 HCT                      43.5                02/09/2021                 HCT                      44.1                08/15/2020                 MCHC                     33.6                02/09/2021                  MCHC                     32.0                08/15/2020                 SODIUM                   136                 07/31/2021                 SODIUM                   137                 08/15/2020                 POTASSIUM                4.4                 07/31/2021                 POTASSIUM                3.8                 08/15/2020                 CHLORIDE                 101                 07/31/2021                 CHLORIDE                 100                 08/15/2020                 CO2                      28                  07/31/2021                 CO2                      29                  08/15/2020                 GLUCOSE                  106 (H)             07/31/2021                 GLUCOSE                  98                  08/15/2020                 BUN                      9                   07/31/2021                 BUN                      12                  08/15/2020                 CREATININE               1.10                07/31/2021                 CREATININE               1.04                08/15/2020                 GFRESTIMATE              78 (L)              07/31/2021                 GFRA                     84                  08/15/2020                 GFRNA                    72                  08/15/2020                 CALCIUM                  9.5                 07/31/2021                 CALCIUM                  9.7                 08/15/2020                 PLT                      263                 02/09/2021                 PLT                      252                 08/15/2020                 INR                      1                   07/14/2018                 INR                      1.0                 07/14/2018             Past Medical History:  No date: Chronic kidney disease, stage 2 (mild)  2017: History of colon polyps  No date: Hyperlipidemia  No date: Hypertension, essential, benign  No date: Obesity    Past Surgical History:  2017:  Colonoscopy  No date: Hb implant tissue marker prostate  No date: Sinus surgery  No date: Tonsillectomy       Prior to Admission medications :  Medication atorvastatin (LIPITOR) 80 MG tablet, Sig TAKE 1 TABLET BY MOUTH DAILY, Start Date 9/27/21, End Date , Taking? , Authorizing Provider Barry Lopez MD    Medication valsartan-hydroCHLOROthiazide (DIOVAN-HCT) 160-12.5 MG per tablet, Sig TAKE 2 TABLETS BY MOUTH EVERY DAY, Start Date 9/18/21, End Date , Taking? , Authorizing Provider Barry Lopez MD    Medication metFORMIN (GLUCOPHAGE) 500 MG tablet, Sig Take 1 tablet by mouth 2 times daily (with meals)., Start Date 8/4/21, End Date , Taking? , Authorizing Provider Barry Lopez MD    Medication NIFEdipine CC (ADALAT CC) 60 MG 24 hr tablet, Sig Take 1 tablet by mouth daily., Start Date 7/27/21, End Date , Taking? , Authorizing Provider Barry Lopez MD    Medication niacin 1000 MG extended-release tablet, Sig TK 1 T PO HS, Start Date 4/29/17, End Date , Taking? , Authorizing Provider Outside Provider    Medication atenolol (TENORMIN) 100 MG tablet, Sig TAKE 1 TABLET BY MOUTH DAILY AS DIRECTED, Start Date 3/22/21, End Date , Taking? , Authorizing Provider Barry Lopez MD    Medication albuterol 108 (90 Base) MCG/ACT inhaler, Sig Inhale 2 puffs into the lungs every 4 hours as needed for Shortness of Breath or Wheezing., Start Date 2/9/21, End Date , Taking? , Authorizing Provider West Leonardo MD    Medication omeprazole (PrilOSEC) 20 MG capsule, Sig TAKE 1 TABLET BY MOUTH TWICE DAILY  Patient taking differently: Take 20 mg by mouth daily. , Start Date 1/4/21, End Date , Taking? , Authorizing Provider Barry Lopez MD    Medication fluticasone (FLONASE) 50 MCG/ACT nasal spray, Sig SPRAY 2 SPTAYS IN EACH NOSTRIL DAILY, Start Date 5/12/20, End Date , Taking? , Authorizing Provider Barry Lopez MD    Medication Cholecalciferol (VITAMIN D3) 2000 units capsule, Sig Take by mouth daily. , Start Date , End  Date , Taking? , Authorizing Provider Outside Provider    Medication aspirin 81 MG tablet, Sig Take 81 mg by mouth daily., Start Date , End Date , Taking? , Authorizing Provider Outside Provider            Relevant Problems   No relevant active problems       Physical Exam     Airway   Mallampati: II  TM Distance: >3 FB  Neck ROM: Full  Neck: Non-tender and Able to place in sniff position  TMJ Mobility: Good    Cardiovascular  Cardiovascular exam normal  Cardio Rhythm: Regular  Cardio Rate: Normal    Head Assessment  Head assessment: Normocephalic and Atraumatic    General Assessment  General Assessment: Alert and oriented and No acute distress    Dental Exam    Dental Note: Patient denies loose teeth. No obvious dental abnormalities on exam    Pulmonary Exam  Pulmonary exam normal  Breath sounds clear to auscultation:  Yes      Anesthesia Plan:    ASA Status: 2  Anesthesia Type: General    Induction: Intravenous  Preferred Airway Type: Nasal Cannula  Maintenance: TIVA  Premedication: None      Post-op Pain Management: Per Surgeon  Postoperative analgesia plan does NOT include opiods    Checklist  Reviewed: Lab Results, Consultations, Patient Summary, Beta Blocker Status, Outside Records, Care Everywhere, NPO Status, Problem list, Medications, Allergies, Past Med History, Pregnancy Test Results, EKG and DNR Status  Consent/Risks Discussed Statement:  The proposed anesthetic plan, including its risks and benefits, have been discussed with the Patient along with the risks and benefits of alternatives. Questions were encouraged and answered and the patient and/or representative understands and agrees to proceed.    I have discussed elements of the patient's history or examination, as noted above and/or as follows, that place the patient at higher risk of complications; age.    I discussed with the patient (and/or patient's legal representative) the risks and benefits of the proposed anesthesia plan, General, which may  include services performed by other anesthesia providers.    Alternative anesthesia plans, if available, were reviewed with the patient (and/or patient's legal representative). Discussion has been held with the patient (and/or patient's legal representative) regarding risks of anesthesia, which include Nausea, Vomiting, Dental Injury, Sore Throat, Allergic Reaction, Post-op Intubation, Intra-operative Awareness and Aspiration and emergent situations that may require change in anesthesia plan.    The patient (and/or patient's legal representative) has indicated understanding, his/her questions have been answered, and he/she wishes to proceed with the planned anesthetic.    Blood Products: Not Anticipated    Comments  Plan Comments: Risks, benefits, and alternatives of general anesthesia discussed including but not limited to sore throat, dental injury, hypotension, intraoperative recall, possible intubation or prolonged postoperative intubation and patient/family understands and agrees to proceed.    Discussed risks/benefits of proceeding with procedure with patient/caregiver given COVID 19 pandemic including but not limited to exposure to the virus and having severe/critical complications such as respiratory failure, intubation, and death and agree to proceed.       Left arm;

## 2022-01-03 ENCOUNTER — EMERGENCY (EMERGENCY)
Facility: HOSPITAL | Age: 42
LOS: 1 days | Discharge: HOME | End: 2022-01-03
Attending: EMERGENCY MEDICINE
Payer: MEDICAID

## 2022-01-03 VITALS
SYSTOLIC BLOOD PRESSURE: 107 MMHG | RESPIRATION RATE: 18 BRPM | HEART RATE: 72 BPM | HEIGHT: 64.57 IN | OXYGEN SATURATION: 99 % | WEIGHT: 125 LBS | DIASTOLIC BLOOD PRESSURE: 56 MMHG | TEMPERATURE: 98 F

## 2022-01-03 PROCEDURE — 99283 EMERGENCY DEPT VISIT LOW MDM: CPT | Mod: 25

## 2022-01-03 PROCEDURE — 10060 I&D ABSCESS SIMPLE/SINGLE: CPT

## 2022-01-03 RX ORDER — MUPIROCIN 20 MG/G
1 OINTMENT TOPICAL
Qty: 30 | Refills: 0
Start: 2022-01-03 | End: 2022-01-12

## 2022-01-03 RX ORDER — IBUPROFEN 200 MG
1 TABLET ORAL
Qty: 20 | Refills: 0
Start: 2022-01-03 | End: 2022-01-07

## 2022-01-03 NOTE — ED PROVIDER NOTE - CLINICAL SUMMARY MEDICAL DECISION MAKING FREE TEXT BOX
40 y/o female presents w/ left great toe w/ periungual infection. Plan: I&D to see if there is purulent drainage, antibiotic, and reassess.

## 2022-01-03 NOTE — ED PROVIDER NOTE - PATIENT PORTAL LINK FT
You can access the FollowMyHealth Patient Portal offered by [facility] by registering at the following website: [email or web address]. By joining [employer]’s FollowMyHealth portal, you will also be able to view your health information using other applications (apps) compatible with our system.

## 2022-01-03 NOTE — ED PROVIDER NOTE - OBJECTIVE STATEMENT
42 y/o female, w/ no significant PMHx, presents w/ left great toe pain x4 days after getting pedicure. Reports pain is around nail which is getting progressively worse w/ more swelling and sensation of throbbing. Denies any fevers, chills, numbness, tingling, injury, or any other complaints. NKDA.

## 2022-01-03 NOTE — ED PROVIDER NOTE - PHYSICAL EXAMINATION
Musculoskeletal: left great toe: periungual erythema w/ swelling and mild fluctuance, no whitish discoloration, cap refill less than 2 seconds, DP/PT pulses 2+

## 2022-01-03 NOTE — ED PROVIDER NOTE - ATTENDING CONTRIBUTION TO CARE
42 y/o female presents w/ left great toe w/ periungual infection. Plan: I&D to see if there is purulent drainage, antibiotic, and reassess.

## 2022-01-03 NOTE — ED PROVIDER NOTE - NSFOLLOWUPINSTRUCTIONS_ED_ALL_ED_FT
Warm soaks with povidone 3-4 times per day for 15-20 minutes.  Follow up with the primary care doctor in 3-5 days.  If you experience any new or worsening symptoms or if you are concerned you can always come back to the emergency for a re-evaluation.  If there were any prescriptions given to you during the visit today take them as prescribed. If you have any questions you can ask the pharmacist.

## 2022-01-03 NOTE — ED PROVIDER NOTE - PROGRESS NOTE DETAILS
No purulent drainage. WIll dc with bactroban, warm soaks, ortho shoe and PMD follow up in 3-5 days. Pt is well appearing walking with steady gait, stable for discharge and follow up without fail with medical doctor. I had a detailed discussion with the patient and/or guardian regarding the historical points, exam findings, and any diagnostic results supporting the discharge diagnosis. Pt educated on care and need for follow up. Strict return instructions and red flag signs and symptoms discussed with patient. Questions answered. Pt shows understanding of discharge information and agrees to follow.

## 2022-01-03 NOTE — ED PROCEDURE NOTE - CPROC ED TIME OUT STATEMENT1
Re-Review Date: 6/17/2021    Chart reviewed. Last Colonoscopy on 1/9/2013 with Dr. Brooks. Recommend repeat  5 years.     Please call pt to schedule Colonoscopy with Dr. Salgado.      Schedule Procedure:   Please Schedule Routine (next available or patient preference)    Procedure: Colonoscopy (23634) with SuPrep    Diagnosis: History of Colon Polyps Z86.010     Is patient:  · Diabetic? No  · ANTIPLATELET / ANTICOAGULATION: Aleve    Latex allergy: Yes     Sleep apnea: No     BMI 35.96    Location: Patient Preference    Special Instructions:   MAC Anesthesia      Covid Vaccine: Completed    Patient is NOT immunocompromised   “Patient's name, , procedure and correct site were confirmed during the [place] Timeout.”

## 2022-01-24 NOTE — ED PROVIDER NOTE - NSCAREINITIATED _GEN_ER
"-- DO NOT REPLY / DO NOT REPLY ALL --  -- Message is from the [facility]--    General Patient Message      Reason for Call: Patient had a fall and isn't feeling well; won't be able to make it for today's appointment . Please contact to reschedule     Caller Information       Type Contact Phone    01/24/2022 08:20 AM CST Phone (Incoming) Ledell Button (Emergency Contact) [phone]          Alternative phone number: None    Turnaround time given to caller: ""This message will be sent to [facility] Provider's name]. The clinical team will fulfill your request as soon as they review your message. \""    " Reynold Orr(Attending)

## 2022-02-01 ENCOUNTER — EMERGENCY (EMERGENCY)
Facility: HOSPITAL | Age: 42
LOS: 1 days | Discharge: HOME | End: 2022-02-01
Attending: EMERGENCY MEDICINE
Payer: MEDICAID

## 2022-02-01 VITALS
HEART RATE: 69 BPM | RESPIRATION RATE: 18 BRPM | OXYGEN SATURATION: 99 % | DIASTOLIC BLOOD PRESSURE: 65 MMHG | WEIGHT: 130.07 LBS | TEMPERATURE: 98 F | HEIGHT: 65 IN | SYSTOLIC BLOOD PRESSURE: 103 MMHG

## 2022-02-01 PROCEDURE — 73080 X-RAY EXAM OF ELBOW: CPT

## 2022-02-01 PROCEDURE — 73080 X-RAY EXAM OF ELBOW: CPT | Mod: 26,LT

## 2022-02-01 PROCEDURE — 73090 X-RAY EXAM OF FOREARM: CPT

## 2022-02-01 PROCEDURE — 99284 EMERGENCY DEPT VISIT MOD MDM: CPT | Mod: 25

## 2022-02-01 PROCEDURE — 73090 X-RAY EXAM OF FOREARM: CPT | Mod: 26,LT

## 2022-02-01 PROCEDURE — 99284 EMERGENCY DEPT VISIT MOD MDM: CPT

## 2022-02-01 RX ORDER — IBUPROFEN 200 MG
400 TABLET ORAL ONCE
Refills: 0 | Status: COMPLETED | OUTPATIENT
Start: 2022-02-01 | End: 2022-02-01

## 2022-02-01 RX ADMIN — Medication 400 MILLIGRAM(S): at 19:07

## 2022-02-01 RX ADMIN — Medication 400 MILLIGRAM(S): at 18:19

## 2022-02-01 NOTE — ED PROVIDER NOTE - OBJECTIVE STATEMENT
42 y/o female complaining of left elbow pain for 1 month. Had some unspecified surgery as a child on the elbow. Patient has considerable pain on flexion of the elbow. PMHx is unremarkable. 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 Patient denies any other symptoms. NKDA.

## 2022-02-01 NOTE — ED PROVIDER NOTE - NSFOLLOWUPCLINICS_GEN_ALL_ED_FT
[facility]  Orthopedics  [address] [place] [address]  Phone: [phone]  Fax: [phone]

## 2022-02-01 NOTE — ED ADULT NURSE NOTE - NSIMPLEMENTINTERV_GEN_ALL_ED
Implemented All Universal Safety Interventions:  [place] to call system. Call bell, personal items and telephone within reach. Instruct patient to call for assistance. Room bathroom lighting operational. Non-slip footwear when patient is off stretcher. Physically safe environment: no spills, clutter or unnecessary equipment. Stretcher in lowest position, wheels locked, appropriate side rails in place.

## 2022-02-01 NOTE — ED PROVIDER NOTE - NSFOLLOWUPINSTRUCTIONS_ED_ALL_ED_FT
Log Out.      [email or web address]® CareNotes®     :  [facility]  	                       TENNIS ELBOW - General Information           Codo de tenista    LO QUE NECESITA SABER:    ¿Qué es el codo de tenista (epicondilitis)?El codo de tenista es inflamación de los tendones en pierce codo. Los tendones son tejidos rupa que conectan el músculo al hueso.    ¿Qué causa el codo de tenista ?El codo de tenista es causado por el uso excesivo de los músculos en pierce antebrazo. Estos músculos son utilizados para enderezar pierce brazo o levantar pierce mano o pierce katie. Movimientos rápidos y repetidos, pueden conducir a inflamación y pequeños desgarres en el tendón. Tenis, pintar y labores manuales son actividades comunes que pueden causar el codo de tenista.    ¿Cuáles son los signos y síntomas del codo de tenista?  •Dolor en el costado del codo que llega hasta el brazo, el antebrazo o los dedos      •Debilidad en la katie o mano      •Dificultad para sujetar, levantar, o agarrar un objeto kita ally taza de café      •Piel enrojecida, hinchada o caliente en la parte externa del codo      ¿Cómo se diagnostica el codo de tenista?Pierce médico revisará la kendall alrededor del codo para detectar las áreas doloridas. También revisara el movimiento de pierce codo, katie, y dedos. Ally radiografía, un ultrasonido o ally ally imagen por resonancia magnética (IRM) pueden mostrar daño del tendón. Es posible que le administren un líquido de contraste para que los tendones se vean con mayor claridad en las imágenes. Dígale al médico si usted alguna vez ha tenido ally reacción alérgica al líquido de contraste. No entre a la jareth donde se realiza la resonancia magnética con algo de metal. El metal puede causar lesiones serias. Dígale al médico si usted tiene algo de metal dentro de pierce cuerpo o por encima.    ¿Cómo se trata el codo de tenista?  •Los dispositivos de apoyopueden ser necesarios para limitar el movimiento del brazo. Por ejemplo, un brazalete, ally abrazadera o ally férula. Estos dispositivos también ayudan a reducir el dolor y prevenir más daño al tendón.      •Acetaminofénalivia el dolor y baja la fiebre. Está disponible sin receta médica. Pregunte la cantidad y la frecuencia con que debe tomarlos. Siga las indicaciones. Kayla las etiquetas de todos los demás medicamentos que esté usando para saber si también contienen acetaminofén, o pregunte a pierce médico o farmacéutico. El acetaminofén puede causar daño en el hígado cuando no se valente de forma correcta. No use más de 4 gramos (4000 miligramos) en total de acetaminofeno en un día.      •Los EZRA,kita el ibuprofeno, ayudan a disminuir la inflamación, el dolor y la fiebre. Elza medicamento está disponible con o sin ally receta médica. Los EZRA pueden causar sangrado estomacal o problemas renales en ciertas personas. Si usted valente un medicamento anticoagulante, siempre pregúntele a pierce médico si los EZRA son seguros para usted. Siempre kayla la etiqueta de elza medicamento y siga las instrucciones.      •Ally inyección de esteroidesayudará a aliviar el dolor y la hinchazón.      •La fisioterapiapodría recomendarse. Un fisioterapeuta le puede enseñar ejercicios para ayudarle a mejorar el movimiento y la fuerza, y para disminuir el dolor.      •La cirugíapodría ser necesaria si los síntomas no mejoran con otros tratamientos. Jhony la cirugía, pierce médico extraerá cualquier tejido dañado. También el médico puede cortar pierce tendón y luego unirlo nuevamente.      ¿Cómo puedo controlar los síntomas?  •Descanseel brazo lesionado y evite las actividades que causan dolor. Bala ayudará a que los tendones sanen.      •Aplique hieloen el codo de 15 a 20 minutos cada hora o kita se le indique. Use ally compresa de hielo o ponga hielo triturado en ally bolsa de plástico. Cúbralo con ally toalla antes de aplicarlo sobre pierce piel. El hielo ayuda a evitar daño al tejido y a disminuir la inflamación y el dolor.      •Elevesu codo de manera que quede por encima del nivel de pierce corazón lo más que pueda. Bala va a disminuir inflamación y el dolor. Apoye pierce codo sobre almohadas o mantas para mantenerlo elevado cómodamente.      ¿Cuándo lisseth buscar atención inmediata?  •Repentinamente no tiene sensación en pierce brazo, mano o dedos.      •Repentinamente no puede  pierce brazo, katie o dedos.      ¿Cuándo lisseth comunicarme con mi médico?  •Los síntomas no mejoran dentro de las 2 semanas, incluso con tratamiento.      •Usted tiene más dolor o debilidad en el brazo, katie, mano o dedos.      •Usted tiene entumecimiento u hormigueo nuevo en el brazo, katie, mano o dedos.      •Usted tiene preguntas o inquietudes acerca de pierce condición o cuidado.      ACUERDOS SOBRE PIERCE CUIDADO:    Usted tiene el derecho de ayudar a planear pierce cuidado. Aprenda todo lo que pueda sobre pierce condición y kita darle tratamiento. Discuta elliott opciones de tratamiento con elliott médicos para decidir el cuidado que usted desea recibir. Usted siempre tiene el derecho de rechazar el tratamiento.       © Copyright [employer] 2022           back to top                          © Copyright [employer] 2022

## 2022-02-01 NOTE — ED PROVIDER NOTE - PATIENT PORTAL LINK FT
You can access the FollowMyHealth Patient Portal offered by [facility] by registering at the following website: [email or web address]. By joining [employer]’s FollowMyHealth portal, you will also be able to view your health information using other applications (apps) compatible with our system.

## 2022-02-01 NOTE — ED ADULT NURSE NOTE - NSSEPSISSUSPECTED_ED_A_ED
No
Pt. states, "I took 6 pills of levocetrizine 5 hours ago because I was feeling funny. It said on the box to take 6 for the day." C/o weakness and dizziness. Denies falls, LOC, n/v.

## 2022-02-10 ENCOUNTER — NON-APPOINTMENT (OUTPATIENT)
Age: 42
End: 2022-02-10

## 2022-02-10 ENCOUNTER — APPOINTMENT (OUTPATIENT)
Dept: ORTHOPEDIC SURGERY | Facility: CLINIC | Age: 42
End: 2022-02-10
Payer: MEDICAID

## 2022-02-10 DIAGNOSIS — M25.522 PAIN IN LEFT ELBOW: ICD-10-CM

## 2022-02-10 PROCEDURE — XXXXX: CPT | Mod: 1L

## 2022-02-10 PROCEDURE — 99202 OFFICE O/P NEW SF 15 MIN: CPT | Mod: 1L

## 2022-03-24 NOTE — ED PROCEDURE NOTE - PROCEDURE DATE TIME, MLM
Chief complaint:   Chief Complaint   Patient presents with   • Office Visit       Vitals:  Visit Vitals  BP (!) 158/92 (BP Location: RUE - Right upper extremity, Patient Position: Sitting, Cuff Size: Large Adult)   Pulse 78   Resp 18   Ht 5' 10\" (1.778 m)   Wt 103.2 kg (227 lb 8.2 oz)   SpO2 97%   BMI 32.65 kg/m²       HISTORY OF PRESENT ILLNESS     HPI       Problem List:    1. Ascending Aortic Aneurysm  2. High Blood Pressure without diagnosis of Hypertension  3. Hyperlipidemia      Matt Da Silva is a 54 year old male  was seen in [facility] cardiology clinic for consultation and office visit on 3/24/2022 at 2:07 PM.     Patient coming into clinic today with referral from Dr. Ribeiro. Patient had elevated blood pressures at the Dentist office.  Patient reports white coat hypertension, patient did not want to start antihypertensives.  Patient advised by PCP to monitor BP's at home and to update PCP's office with BP readings.  Patient has dilated ascending aorta 3.9 cm on Echo 10/6/2021 (previous 3.6 cm).  Patient had stress test completed 10/2020 which was negative for ischemia. No known cardiac history.    Today, the patient reports to be tired. He reports he gets up at three in the morning for work. He denies Chest pain, Dizziness, Palpitations, Syncope, PND, Orthopnea, Ankle Edema, Claudication and Fatigue.       Matt Da Silva  has a past medical history of Erectile dysfunction and Malignant neoplasm (CMS/HCC).    Other significant problems:  Patient Active Problem List    Diagnosis Date Noted   • Ascending aortic aneurysm (CMS/HCC) 11/15/2020     Priority: Low     11/15/2020: 3.6 cm minimally enlarged ascending aorta noted incidentally on stress echocardiogram.     • History of cardiovascular stress test 10/12/2020     Priority: Low     9/17/20 stress test:Function capacity:  Good  Target heart rate:  Achieved  Ischemic symptoms:  None  D[facility] Risk Score:  Low risk  Ischemic EKG changes:  None       • Mixed hyperlipidemia 07/02/2018     Priority: Low   • Chronic pain in right foot 05/02/2016     Priority: Low   • ED (erectile dysfunction) 12/01/2014     Priority: Low   • Well adult exam 12/01/2014     Priority: Low       PAST MEDICAL, FAMILY AND SOCIAL HISTORY     Medications:  Current Outpatient Medications   Medication   • sildenafil (VIAGRA) 100 MG tablet   • HYDROcodone-acetaminophen (NORCO) 5-325 MG per tablet     No current facility-administered medications for this visit.       Allergies:  ALLERGIES:  No Known Allergies    Past Medical  History/Surgeries:  Past Medical History:   Diagnosis Date   • Erectile dysfunction    • Malignant neoplasm (CMS/HCC)     BCC right side of neck       Past Surgical History:   Procedure Laterality Date   • Colonoscopy  9/8/15    5 yr f/u   • Colonoscopy  01/29/2021    Digna: 5y FU for hx polyps   • Colonoscopy diagnostic  09/04/2012   • Colorec canc scrn,fecal occult blood  08/07/2012   • Fracture surgery  1984    left leg,       Family History:  Family History   Problem Relation Age of Onset   • Cancer Mother 37        breast   • Stroke Father    • Myocardial Infarction Father    • Cancer Maternal Grandmother         colon       Social History:  Social History     Tobacco Use   • Smoking status: Never Smoker   • Smokeless tobacco: Never Used   Substance Use Topics   • Alcohol use: Yes     Alcohol/week: 2.0 - 3.0 standard drinks     Types: 2 - 3 Standard drinks or equivalent per week       REVIEW OF SYSTEMS     Review of Systems   Constitutional: Negative for activity change, appetite change, fatigue and unexpected weight change.   Respiratory: Negative for apnea, chest tightness and shortness of breath.    Cardiovascular: Negative for chest pain, palpitations and leg swelling.   Musculoskeletal: Negative for myalgias.   Skin: Negative for pallor.   Neurological: Negative for dizziness, syncope, light-headedness and numbness.   All other systems reviewed and are  negative.      PHYSICAL EXAM     Physical Exam  Vitals reviewed.   Constitutional:       Appearance: He is well-developed.   HENT:      Head: Normocephalic.      Neck: Neck supple.   Neck:      Thyroid: No thyromegaly.      Vascular: No JVD.      Trachea: No tracheal deviation.   Cardiovascular:      Rate and Rhythm: Normal rate and regular rhythm.      Heart sounds: Normal heart sounds, S1 normal and S2 normal. No murmur heard.   No systolic murmur is present.   No diastolic murmur is present.    No friction rub. No gallop.   Pulmonary:      Effort: Pulmonary effort is normal. No respiratory distress.      Breath sounds: Normal breath sounds. No decreased breath sounds, wheezing or rales.   Chest:      Chest wall: No tenderness.   Abdominal:      Palpations: Abdomen is soft.   Musculoskeletal:         General: Normal range of motion.   Skin:     General: Skin is warm.   Neurological:      Mental Status: He is alert and oriented to person, place, and time.         Echocardiogram 10/6/2021:  FINAL IMPRESSIONS:  Normal left ventricular systolic function. Left ventricular ejection fraction, 61 %. LV Global longitudinal strain 17% (normal).  Mildly increased left ventricular wall thickness.  Grade I/IV diastolic dysfunction (abnormal relaxation filling pattern), normal to mildly elevated filling pressures.  Normal right ventricular size and systolic function.  Normal left atrial size.  Normal right atrial size.  No significant valve abnormalities.  No pericardial effusion.  Dilated ascending aorta 3.9 cm, dilated aortic root 4 cm.  (Ascending aorta 3.6 cm 11/2020)    Echo Stress Test 11/13/2020:  FINAL IMPRESSIONS:  Treadmill stress echo.  Good exercise tolerance, achieving 11.7 METS , Darion protocol 10 minutes and 5 seconds, in a 53 year old  male  No exercise induced chest pain or ischemic ECG changes or significant arrhythmia  No echocardiographic evidence of myocardial ischemia.  Cardiac risk: Low  (ascending aorta  mildly enlarged at 3.6 cm)    EKG 8/17/2020:      ASSESSMENT/PLAN     No results for input(s): HGB, BUN, CREATININE, HGBA1C, CRP, POTASSIUM, BNP, CHOLESTEROL, HDL, CHOHDL, TRIGLYCERIDE, CALCLDL, AST, GPT, INR, DIG, LDLDIR, TSH in the last 8765 hours.    Checklist:   1. Ejection fraction checked :   %. [] Not Done  2. Last Stress test: [].Negative; [] Positive:; [] Not done.  3. BP controlled (as per Guidelines  mmHg) : []Yes  []No   4. Serum Creatinine checked: []Yes / [] No  5. LDL controlled per guidelines: []Yes / [] No  6. PAD screening DIONNA done per guidelines: []Yes / []No   7. Carotid doppler study per guidelines: []Yes ::  []No     LDL (mg/dL)   Date Value   09/01/2020 85     Creatinine (mg/dL)   Date Value   09/15/2020 0.95   09/01/2020 1.57 (H)   07/13/2019 0.93       ASSESSMENT AND PLAN:    Matt Da Silva presents with the following active problems:    PROBLEMS:    1. Ascending Aortic Aneurysm  2. High Blood Pressure without diagnosis of Hypertension  3. Hyperlipidemia    RECOMMENDATIONS:    Ascending Aortic Aneurysm:  Echo 11/2020: Ascending Aorta 3.6 CM  Echo 10/6/2020 : Ascending Aorta 3.9 CM  He denies any back pain. Will continue to monitor.     High Blood Pressure:  Vitals:    03/24/22 1346   BP: (!) 158/92   Pulse: 78   Resp: 18     Blood pressure is elevated in office visit  Start taking Losartan 50 mg daily. Will order some labs too.    Hyperlipidemia:  LDL (mg/dL)   Date Value   09/01/2020 85   Patient currently not on anti lipidemics  Continue present management.        FOLLOW UP: Matt Da Silva is expected to follow up in 3 months.    Recommendations Summary:    1. Continue Current Medications  2. Start taking Losartan 50 mg daily  3. Advised Labs  4. Encouraged Regular Exercise    The documentation recorded by the scribe accurately and completely reflects the service(s) I personally performed and the decisions made by me.     This entire documentation recorded accurately and completely  reflects the service(s) performed and clinical decisions made. I attest that I have personally formulated the clinical plan, reviewed, edited the note, and remain entirely responsible for its content.    Summary:  Matt Da Silva is a 54 year old male presenting with the above listed problems.  No other new concerning clinical signs or symptoms. Matt Da Silva remains clinically stable. Today's exam finding are noted. Reviewed medications, last ejection fraction,( 61 %), low-density lipoprotein,(85 mg/dL) serum creatinine,( 0.95 mg/dL) and other relevant tests. Advised this patient to  continue current medications.  Encouraged compliance with prescribed therapies and lifestyle changes. Matt Da Silva's  health concerns and questions were addressed. Future recommendations and any required tests were explained, and cardiology were appointments scheduled. Suggested follow up in 3 months, earlier if needed.       Melody Bingham M.D., FACP, FACC. [facility]  Interventional Cardiology  [facility]  Pager:[phone]  Jayjay Alberts: [phone],   Yasmin: [phone]  Sariah:[phone]  2:07 PM/3/24/2022.   21-Dec-2020 19:24

## 2022-08-23 NOTE — ED ADULT NURSE NOTE - NS PRO PASSIVE SMOKE EXP
VISION: Visual Acuity Glasses  Visual Acuity (Snellen - Linear)       Right Left    Dist cc 20/20 20/25 -1    Near cc 20/20 20/25    Correction: Glasses          Final Rx Glasses  Final Rx       Sphere Cylinder Axis Horz Prism    Right +0.25 -1.00 115     Left -0.25 -1.25 092 2 IN in    Expiration Date: 8/24/2023            Impression and Plan:    1. Regular astigmatism of both eyes    2. Exotropia of left eye        1,2.  Follow up in 4 weeks after remaking the eyeglass lens.     No

## 2022-09-17 NOTE — ED ADULT NURSE NOTE - DRUG PRE-SCREENING (DAST -1)
HIV disease Encounter for palliative care Thrombophlebitis of left external iliac vein Statement Selected

## 2023-01-02 PROBLEM — M25.522 LEFT ELBOW PAIN: Status: ACTIVE | Noted: 2022-02-10

## 2023-01-02 NOTE — HISTORY OF PRESENT ILLNESS
[de-identified] : 42 yo RHD female presents today with left elbow pain. She states she had left elbow surgery when she was 7 yrs old in [facility]. She went to [facility] 2/1/22 due to pain in her elbow. She is here to be evaluated for further treatment.

## 2023-01-17 ENCOUNTER — APPOINTMENT (OUTPATIENT)
Dept: OBGYN | Facility: CLINIC | Age: 43
End: 2023-01-17

## 2023-03-23 NOTE — ED PROVIDER NOTE - SOCIAL CONCERNS
None Complex Repair And Burow's Graft Text: The defect edges were debeveled with a #15 scalpel blade.  The primary defect was closed partially with a complex linear closure.  Given the location of the defect, shape of the defect, the proximity to free margins and the presence of a standing cone deformity a Burow's graft was deemed most appropriate to repair the remaining defect.  The graft was trimmed to fit the size of the remaining defect.  The graft was then placed in the primary defect, oriented appropriately, and sutured into place.

## 2023-05-11 NOTE — ED ADULT TRIAGE NOTE - HEART RATE (BEATS/MIN)
81 Saucerization Excision Additional Text (Leave Blank If You Do Not Want): The margin was drawn around the clinically apparent lesion.  Incisions were then made along these lines, in a tangential fashion, to the appropriate tissue plane and the lesion was extirpated.

## 2024-09-14 NOTE — ED ADULT NURSE NOTE - DISCHARGE DATE/TIME
* full consultation to follow     35-year-old female with a history of anxiety depression borderline personality disorder and IV drug abuse transferred from an outside facility with a  right open tibia shaft fracture.  Admitted to the Trauma Service.  No other injuries identified.  Plan to take her to the operating room this evening for irrigation and debridement with intramedullary stabilization of her right tibia.  Patient is started on Zosyn, has a history of staph infections in the past and we will add vancomycin as well.  Call with questions or concerns.      This note/OR report was created with the assistance of  voice recognition software or phone  dictation.  There may be transcription errors as a result of using this technology however minimal. Effort has been made to assure accuracy of transcription but any obvious errors or omissions should be clarified with the author of the document.         Nico Kurtz MD  Orthopedic Trauma  [facility]                             24-Jan-2018 20:04

## 2025-06-20 NOTE — ED ADULT NURSE NOTE - CAS DISCH ACCOMP BY
ASSESSMENT & PLAN:   Diagnoses and all orders for this visit:    Well woman exam with routine gynecological exam    Routine gynecological examination  -     Liquid-based pap, screening    Screening for malignant neoplasm of the cervix  -     Liquid-based pap, screening    Encounter for prescription of oral contraceptives  -     levonorgestrel-ethinyl estradiol (Vienva) 0.1-20 MG-MCG per tablet; Take 1 tablet by mouth daily    Routine screening for STI (sexually transmitted infection)  -     Chlamydia/GC amplified DNA by PCR  -     Trichomonas vaginalis Thin prep    Vulvar itching  -     clobetasol (TEMOVATE) 0.05 % ointment; Apply topically 2 (two) times a day          The following were reviewed in today's visit: ASCCP guidelines, Gardasil vaccination, STD testing breast self exam, STD testing, exercise, and healthy diet.    Patient to return to office in yearly for annual exam.     All questions have been answered to her satisfaction.        CC:  Annual Gynecologic Examination  Chief Complaint   Patient presents with    Gynecologic Exam     The patient is here for a yearly exam. Neg pap 12/3/19, 22   Regular periods.   No vaginal, bowel, bladder or breast problems.            HPI: Radha Sanchez is a 26 y.o.  who presents for annual gynecologic examination.  She has the following concerns:  vulvar itching right side. Hx of psoriasis      Health Maintenance:    Exercise: intermittently  Breast exams/breast awareness: yes    Past Medical History[1]    Past Surgical History[2]    Past OB/Gyn History:   Patient's last menstrual period was 2025 (exact date).    Last Pap:  : no abnormalities  History of abnormal Pap smear: no  HPV vaccine completed: yes    Patient is currently sexually active.   STD testing: yes  Current contraception:cOCP (estrogen/progesterone)      Family History  Family History[3]    Family history of uterine or ovarian cancer: yes  Family history of breast cancer:  "no  Family history of colon cancer: no    Social History:  Social History     Socioeconomic History    Marital status: Single     Spouse name: Not on file    Number of children: Not on file    Years of education: Not on file    Highest education level: Not on file   Occupational History    Not on file   Tobacco Use    Smoking status: Never    Smokeless tobacco: Never   Vaping Use    Vaping status: Former    Start date: 6/13/2018    Quit date: 7/14/2021    Substances: Nicotine, Flavoring   Substance and Sexual Activity    Alcohol use: Yes     Alcohol/week: 1.0 - 3.0 standard drink of alcohol     Types: 1 - 3 Glasses of wine per week     Comment: social    Drug use: Not Currently     Types: Marijuana     Comment: former    Sexual activity: Yes     Partners: Male     Birth control/protection: OCP   Other Topics Concern    Not on file   Social History Narrative    Daily caffeinated cola consumption    Drinks coffee     Social Drivers of Health     Financial Resource Strain: Not on file   Food Insecurity: Not on file   Transportation Needs: Not on file   Physical Activity: Not on file   Stress: Not on file   Social Connections: Not on file   Intimate Partner Violence: Not on file   Housing Stability: Not on file     Domestic violence screen: negative    Allergies:  Allergies[4]    Medications:  Current Medications[5]    Review of Systems:  Review of Systems   Constitutional:  Negative for chills, fever and unexpected weight change.   Respiratory:  Negative for shortness of breath.    Cardiovascular:  Negative for chest pain.   Gastrointestinal:  Negative for abdominal pain, diarrhea, nausea and vomiting.   Skin:  Negative for rash.   Psychiatric/Behavioral:  Negative for dysphoric mood. The patient is not nervous/anxious.          Physical Exam:  /80   Ht 5' 6\" (1.676 m)   Wt 76.2 kg (168 lb)   LMP 06/14/2025 (Exact Date)   BMI 27.12 kg/m²    Physical Exam  Constitutional:       Appearance: Normal appearance. "   Genitourinary:      Vulva and urethral meatus normal.      No lesions in the vagina.      Genitourinary Comments: Mild irritation      Right Labia: No rash or lesions.     Left Labia: No lesions or rash.           No vaginal discharge, erythema or bleeding.        Right Adnexa: not tender and no mass present.     Left Adnexa: not tender and no mass present.     No cervical discharge or lesion.      Uterus is not tender.   Breasts:     Breasts are symmetrical.      Right: No mass or skin change.      Left: No mass or skin change.   HENT:      Head: Normocephalic and atraumatic.     Cardiovascular:      Rate and Rhythm: Normal rate and regular rhythm.      Heart sounds: Normal heart sounds. No murmur heard.     No friction rub. No gallop.   Pulmonary:      Effort: Pulmonary effort is normal.      Breath sounds: Normal breath sounds. No wheezing, rhonchi or rales.   Abdominal:      General: Abdomen is flat. There is no distension.      Palpations: Abdomen is soft.      Tenderness: There is no abdominal tenderness.     Musculoskeletal:      Cervical back: Neck supple.   Lymphadenopathy:      Upper Body:      Right upper body: No axillary adenopathy.      Left upper body: No axillary adenopathy.     Neurological:      General: No focal deficit present.      Mental Status: She is alert.     Skin:     General: Skin is warm and dry.     Psychiatric:         Mood and Affect: Mood normal.         Behavior: Behavior normal.   Vitals reviewed.                                    [1]   Past Medical History:  Diagnosis Date    Asthma 2015    Difficulty walking 1/15/22    Neurologic disorder     Black outs    Varicella    [2]   Past Surgical History:  Procedure Laterality Date    WISDOM TOOTH EXTRACTION     [3]   Family History  Problem Relation Name Age of Onset    Cancer Maternal Grandmother Victoriano Sanchez     Arthritis Maternal Grandmother Victoriano Sanchez     Arthritis Family      Osteoarthritis Family      Hypertension Family       Varicose Veins Family          lower extremities    Stroke Other      Multiple sclerosis Mother      Stroke Maternal Grandfather Matthew         At least 2    Kidney disease Maternal Grandfather Matthew     Uterine cancer Maternal Aunt     [4] No Known Allergies  [5]   Current Outpatient Medications:     clobetasol (TEMOVATE) 0.05 % external solution, Apply topically, Disp: , Rfl:     clobetasol (TEMOVATE) 0.05 % ointment, Apply topically 2 (two) times a day, Disp: 60 g, Rfl: 0    desonide (DESOWEN) 0.05 % cream, Apply topically 2 (two) times a day, Disp: 30 g, Rfl: 0    Fluticasone-Salmeterol (Advair) 100-50 mcg/dose inhaler, Inhale 1 puff 2 (two) times a day Rinse mouth after use., Disp: 60 blister, Rfl: 1    hydrocortisone 2.5 % cream, Apply 1 Application topically in the morning and 1 Application in the evening. To affected area., Disp: , Rfl:     levonorgestrel-ethinyl estradiol (Vienva) 0.1-20 MG-MCG per tablet, Take 1 tablet by mouth daily, Disp: 84 tablet, Rfl: 4     Self